# Patient Record
Sex: MALE | Race: WHITE | NOT HISPANIC OR LATINO | Employment: FULL TIME | URBAN - METROPOLITAN AREA
[De-identification: names, ages, dates, MRNs, and addresses within clinical notes are randomized per-mention and may not be internally consistent; named-entity substitution may affect disease eponyms.]

---

## 2019-11-01 ENCOUNTER — HOSPITAL ENCOUNTER (EMERGENCY)
Facility: MEDICAL CENTER | Age: 34
End: 2019-11-02
Attending: EMERGENCY MEDICINE
Payer: COMMERCIAL

## 2019-11-01 VITALS
OXYGEN SATURATION: 95 % | WEIGHT: 156.53 LBS | HEIGHT: 72 IN | TEMPERATURE: 98.2 F | DIASTOLIC BLOOD PRESSURE: 82 MMHG | BODY MASS INDEX: 21.2 KG/M2 | SYSTOLIC BLOOD PRESSURE: 117 MMHG | HEART RATE: 60 BPM | RESPIRATION RATE: 17 BRPM

## 2019-11-01 DIAGNOSIS — S05.01XA ABRASION OF RIGHT CORNEA, INITIAL ENCOUNTER: ICD-10-CM

## 2019-11-01 PROCEDURE — 99283 EMERGENCY DEPT VISIT LOW MDM: CPT

## 2019-11-01 RX ORDER — PROPARACAINE HYDROCHLORIDE 5 MG/ML
1 SOLUTION/ DROPS OPHTHALMIC ONCE
Status: COMPLETED | OUTPATIENT
Start: 2019-11-02 | End: 2019-11-02

## 2019-11-01 SDOH — HEALTH STABILITY: MENTAL HEALTH: HOW OFTEN DO YOU HAVE A DRINK CONTAINING ALCOHOL?: NEVER

## 2019-11-01 ASSESSMENT — LIFESTYLE VARIABLES: DO YOU DRINK ALCOHOL: NO

## 2019-11-01 NOTE — LETTER
FORM C-4:  EMPLOYEE’S CLAIM FOR COMPENSATION/ REPORT OF INITIAL TREATMENT  EMPLOYEE’S CLAIM - PROVIDE ALL INFORMATION REQUESTED   First Name  Dean Last Name  Kayla Birthdate  1985 Age  34 y.o. Sex  male Claim Number   Home Employee Address  [address] [place]  [address] Height  1.829 m (6') Weight  71 kg (156 lb 8.4 oz) N  [id number]   Mailing Employee Address                           [address]  [address] Telephone  [phone] (home)  Primary Language Spoken  ENGLISH   Insurer  *** Third Party   N/A Employee's Occupation (Job Title) When Injury or Occupational Disease Occurred     Employer's Name   Telephone      Employer Address   City   State   Zip     Date of Injury  11/1/2019       Hour of Injury  5:00 PM Date Employer Notified  11/1/2019 Last Day of Work after Injury or Occupational Disease  11/1/2019 Supervisor to Whom Injury Reported  Tiffany Weiss   Address or Location of Accident (if applicable)  [[address] ]   What were you doing at the time of accident? (if applicable)  Cutting ceiling openings   How did this injury or occupational disease occur? Be specific and answer in detail. 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   If you believe that you have an occupational disease, when did you first have knowledge of the disability and it relationship to your employment?  N/A Witnesses to the Accident  Tiffany Weiss     Nature of Injury or Occupational Disease  Workers' Compensation  Part(s) of Body Injured or Affected  Eye (R), N/A, N/A   I certify that the above is true and correct to the best of my knowledge and that I have provided this information in order to obtain the benefits of [place] or Chapter 617 of [place]  I hereby authorize any physician, chiropractor, surgeon, practitioner, or other  person, any hospital, including [facility] or [facility] hospital, any medical service organization, any insurance company, or other institution or organization to release to each other, any medical or other information, including benefits paid or payable, pertinent to this injury or disease, except information relative to diagnosis, treatment and/or counseling for AIDS, psychological conditions, alcohol or controlled substances, for which I must give specific authorization.  A Photostat of this authorization shall be as valid as the original.   Date   Place Employee’s Signature         THIS REPORT MUST BE COMPLETED AND MAILED WITHIN 3 WORKING DAYS OF TREATMENT   Place  [facility], EMERGENCY DEPT Name of Facility   [facility]   Date  11/1/2019 Diagnosis  (S05.01XA) Abrasion of right cornea, initial encounter Is there evidence the injured employee was under the influence of alcohol and/or another controlled substance at the time of accident?      Hour  12:21 AM Description of Injury or Disease  Abrasion of right cornea, initial encounter    Treatment    Have you advised the patient to remain off work five days or more?      X-Ray Findings    If Yes From Date    To Date      From information given by the employee, together with medical evidence, can you directly connect this injury or occupational disease as job incurred?    If No, is the employee capable of: Full Duty    Modified Duty      Is additional medical care by a physician indicated?    If Modified Duty, Specify any Limitations / Restrictions      Do you know of any previous injury or disease contributing to this condition or occupational disease?      Date  11/2/2019 Print Doctor’s Name  Shahzad Leal I certify the employer’s copy of this form was mailed on:   Address  [address] [place] [address] [id number] [phone] Insurer’s Use Only   Provider’s Tax ID Number  [id number] Telephone  Dept:  "[phone]    Doctor’s Signature    Degree             BRIEF DESCRIPTION OF RIGHTS AND BENEFITS  ([place]ursuant to [place]RS 616C.050)    Notice of Injury or Occupational Disease (Incident Report Form C-1): If an injury or occupational disease (OD) arises out of and in the course of employment, you must provide written notice to your employer as soon as practicable, but no later than 7 days after the accident or OD. Your employer shall maintain a sufficient supply of the required forms.    Claim for Compensation (Form C-4): If medical treatment is sought, the form C-4 is available at the place of initial treatment. A completed \"Claim for Compensation\" (Form C-4) must be filed within 90 days after an accident or OD. The treating physician or chiropractor must, within 3 working days after treatment, complete and mail to the employer, the employer's insurer and third-party , the Claim for Compensation.    Medical Treatment: If you require medical treatment for your on-the-job injury or OD, you may be required to select a physician or chiropractor from a list provided by your workers’ compensation insurer, if it has contracted with an Organization for Managed Care (MCO) or Preferred Provider Organization (PPO) or providers of health care. If your employer has not entered into a contract with an MCO or PPO, you may select a physician or chiropractor from the Panel of Physicians and Chiropractors. Any medical costs related to your industrial injury or OD will be paid by your insurer.    Temporary Total Disability (TTD): If your doctor has certified that you are unable to work for a period of at least 5 consecutive days, or 5 cumulative days in a 20-day period, or places restrictions on you that your employer does not accommodate, you may be entitled to TTD compensation.    Temporary Partial Disability (TPD): If the wage you receive upon reemployment is less than the compensation for TTD to which you are entitled, the " insurer may be required to pay you TPD compensation to make up the difference. TPD can only be paid for a maximum of 24 months.    Permanent Partial Disability (PPD): When your medical condition is stable and there is an indication of a PPD as a result of your injury or OD, within 30 days, your insurer must arrange for an evaluation by a rating physician or chiropractor to determine the degree of your PPD. The amount of your PPD award depends on the date of injury, the results of the PPD evaluation and your age and wage.    Permanent Total Disability (PTD): If you are medically certified by a treating physician or chiropractor as permanently and totally disabled and have been granted a PTD status by your insurer, you are entitled to receive monthly benefits not to exceed 66 2/3% of your average monthly wage. The amount of your PTD payments is subject to reduction if you previously received a PPD award.    Vocational Rehabilitation Services: You may be eligible for vocational rehabilitation services if you are unable to return to the job due to a permanent physical impairment or permanent restrictions as a result of your injury or occupational disease.    Transportation and Per Tc Reimbursement: You may be eligible for travel expenses and per tc associated with medical treatment.    Reopening: You may be able to reopen your claim if your condition worsens after claim closure.     Appeal Process: If you disagree with a written determination issued by the insurer or the insurer does not respond to your request, you may appeal to the Department of Administration, , by following the instructions contained in your determination letter. You must appeal the determination within 70 days from the date of the determination letter at [address], [address], [place], [place] [address], or [address], [address], [place] [address]. If you disagree with the  decision, you  may appeal to the Department of Administration, . You must file your appeal within 30 days from the date of the  decision letter at [address], [address], [place], [place] [address], or [address], [place], [place] [address]. If you disagree with a decision of an , you may file a petition for judicial review with the District Court. You must do so within 30 days of the Appeal Officer’s decision. You may be represented by an  at your own expense or you may contact the [facility] for possible representation.    [place]  for Injured Workers (NAIW): If you disagree with a  decision, you may request that NAIW represent you without charge at an  Hearing. For information regarding denial of benefits, you may contact the [facility] at: [address], [address], [place] [address], [phone], or [address], [address], [place] [address], [phone]    To File a Complaint with the Division: If you wish to file a complaint with the  of the Division of Industrial Relations (DIR),  please contact the Workers’ Compensation Section, [address], [address], [place], [place] [address], telephone [phone], or [address], [address], [place], [place] [address], telephone [phone].    For assistance with Workers’ Compensation Issues: You may contact the Office of the Governor Consumer Health Assistance, [address], [address], [place], [place] [address], Toll Free 1-265.707.8555, Web site: http://govcha.[facility].nv., E-mail lucy@govcha.[facility].nv.  D-2 (rev. 06/18)    __________________________________________________________________                                    _________________  Employee Name / Signature                                                                                                                           Date

## 2019-11-01 NOTE — LETTER
FORM C-4:  EMPLOYEE’S CLAIM FOR COMPENSATION/ REPORT OF INITIAL TREATMENT  EMPLOYEE’S CLAIM - PROVIDE ALL INFORMATION REQUESTED   First Name  Dean Last Name  Kayla Birthdate  1985 Age  34 y.o. Sex  male Claim Number   Home Employee Address  [address] [place]  [address] Height  1.829 m (6') Weight  71 kg (156 lb 8.4 oz) N  [id number]   Mailing Employee Address                           [address]  [address] Telephone  [phone] (home)  Primary Language Spoken  ENGLISH   Insurer  *** Third Party   N/A Employee's Occupation (Job Title) When Injury or Occupational Disease Occurred     Employer's Name   Telephone      Employer Address   City   State   Zip     Date of Injury  11/1/2019       Hour of Injury  5:00 PM Date Employer Notified  11/1/2019 Last Day of Work after Injury or Occupational Disease  11/1/2019 Supervisor to Whom Injury Reported  Tiffany Weiss   Address or Location of Accident (if applicable)  [[address] ]   What were you doing at the time of accident? (if applicable)  Cutting ceiling openings   How did this injury or occupational disease occur? Be specific and answer in detail. 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   If you believe that you have an occupational disease, when did you first have knowledge of the disability and it relationship to your employment?  N/A Witnesses to the Accident  Tiffany Weiss     Nature of Injury or Occupational Disease  Workers' Compensation  Part(s) of Body Injured or Affected  Eye (R), N/A, N/A   I certify that the above is true and correct to the best of my knowledge and that I have provided this information in order to obtain the benefits of [place]’s [place] ([place] 616A [place], inclusive or Chapter 617 of [place]RS).  I hereby authorize any physician, chiropractor, surgeon, practitioner, or other  person, any hospital, including [facility] or [facility] hospital, any medical service organization, any insurance company, or other institution or organization to release to each other, any medical or other information, including benefits paid or payable, pertinent to this injury or disease, except information relative to diagnosis, treatment and/or counseling for AIDS, psychological conditions, alcohol or controlled substances, for which I must give specific authorization.  A Photostat of this authorization shall be as valid as the original.   Date   Place Employee’s Signature         THIS REPORT MUST BE COMPLETED AND MAILED WITHIN 3 WORKING DAYS OF TREATMENT   Place  [facility], EMERGENCY DEPT Name of Facility   [facility]   Date  11/1/2019 Diagnosis  (S05.01XA) Abrasion of right cornea, initial encounter Is there evidence the injured employee was under the influence of alcohol and/or another controlled substance at the time of accident?      Hour  12:22 AM Description of Injury or Disease  Abrasion of right cornea, initial encounter    Treatment    Have you advised the patient to remain off work five days or more?      X-Ray Findings    If Yes From Date    To Date      From information given by the employee, together with medical evidence, can you directly connect this injury or occupational disease as job incurred?    If No, is the employee capable of: Full Duty    Modified Duty      Is additional medical care by a physician indicated?    If Modified Duty, Specify any Limitations / Restrictions      Do you know of any previous injury or disease contributing to this condition or occupational disease?      Date  11/2/2019 Print Doctor’s Name  Shahzad Leal I certify the employer’s copy of this form was mailed on:   Address  [address] [place] [address] [id number] [phone] Insurer’s Use Only   Provider’s Tax ID Number  [id number] Telephone  Dept:  "[phone]    Doctor’s Signature    Degree             BRIEF DESCRIPTION OF RIGHTS AND BENEFITS  ([place]ursuant to [place]RS 616C.050)    Notice of Injury or Occupational Disease (Incident Report Form C-1): If an injury or occupational disease (OD) arises out of and in the course of employment, you must provide written notice to your employer as soon as practicable, but no later than 7 days after the accident or OD. Your employer shall maintain a sufficient supply of the required forms.    Claim for Compensation (Form C-4): If medical treatment is sought, the form C-4 is available at the place of initial treatment. A completed \"Claim for Compensation\" (Form C-4) must be filed within 90 days after an accident or OD. The treating physician or chiropractor must, within 3 working days after treatment, complete and mail to the employer, the employer's insurer and third-party , the Claim for Compensation.    Medical Treatment: If you require medical treatment for your on-the-job injury or OD, you may be required to select a physician or chiropractor from a list provided by your workers’ compensation insurer, if it has contracted with an Organization for Managed Care (MCO) or Preferred Provider Organization (PPO) or providers of health care. If your employer has not entered into a contract with an MCO or PPO, you may select a physician or chiropractor from the Panel of Physicians and Chiropractors. Any medical costs related to your industrial injury or OD will be paid by your insurer.    Temporary Total Disability (TTD): If your doctor has certified that you are unable to work for a period of at least 5 consecutive days, or 5 cumulative days in a 20-day period, or places restrictions on you that your employer does not accommodate, you may be entitled to TTD compensation.    Temporary Partial Disability (TPD): If the wage you receive upon reemployment is less than the compensation for TTD to which you are entitled, the " insurer may be required to pay you TPD compensation to make up the difference. TPD can only be paid for a maximum of 24 months.    Permanent Partial Disability (PPD): When your medical condition is stable and there is an indication of a PPD as a result of your injury or OD, within 30 days, your insurer must arrange for an evaluation by a rating physician or chiropractor to determine the degree of your PPD. The amount of your PPD award depends on the date of injury, the results of the PPD evaluation and your age and wage.    Permanent Total Disability (PTD): If you are medically certified by a treating physician or chiropractor as permanently and totally disabled and have been granted a PTD status by your insurer, you are entitled to receive monthly benefits not to exceed 66 2/3% of your average monthly wage. The amount of your PTD payments is subject to reduction if you previously received a PPD award.    Vocational Rehabilitation Services: You may be eligible for vocational rehabilitation services if you are unable to return to the job due to a permanent physical impairment or permanent restrictions as a result of your injury or occupational disease.    Transportation and Per Tc Reimbursement: You may be eligible for travel expenses and per tc associated with medical treatment.    Reopening: You may be able to reopen your claim if your condition worsens after claim closure.     Appeal Process: If you disagree with a written determination issued by the insurer or the insurer does not respond to your request, you may appeal to the Department of Administration, , by following the instructions contained in your determination letter. You must appeal the determination within 70 days from the date of the determination letter at [address], [address], [place], [place] [address], or [address], [address], [place] [address]. If you disagree with the  decision, you  may appeal to the Department of Administration, . You must file your appeal within 30 days from the date of the  decision letter at [address], [address], [place], [place] [address], or [address], [place], [place] [address]. If you disagree with a decision of an , you may file a petition for judicial review with the District Court. You must do so within 30 days of the Appeal Officer’s decision. You may be represented by an  at your own expense or you may contact the [facility] for possible representation.    [place]  for In[place] ([place]IW): If you disagree with a  decision, you may request that NAIW represent you without charge at an  Hearing. For information regarding denial of benefits, you may contact the [facility] at: [address], [address], [place] [address], [phone], or [address], [place] [address], [phone]    To File a Complaint with the Division: If you wish to file a complaint with the  of the Division of Industrial Relations (DIR),  please contact the Workers’ Compensation Section, [address], [address], [place], [place] [address], telephone [phone]) [phone], or [address], [address], [place], [place] [address], telephone [phone].    For assistance with Workers’ Compensation Issues: You may contact the Office of the Governor Consumer Health Assistance, [address], [address], [place], [place] [address], Toll Free 1-275.129.6188, Web site: http://govcha.[facility].nv., E-mail lucy@[email or web address].[email or web address].  D-2 (rev. 06/18)    __________________________________________________________________                                    _________________  Employee Name / Signature                                                                                                                           Date

## 2019-11-01 NOTE — LETTER
FORM C-4:  EMPLOYEE’S CLAIM FOR COMPENSATION/ REPORT OF INITIAL TREATMENT  EMPLOYEE’S CLAIM - PROVIDE ALL INFORMATION REQUESTED   First Name  Dean Last Name  Kayla Birthdate  1985 Age  34 y.o. Sex  male Claim Number   Home Employee Address  [address] [place]  [address] Height  1.829 m (6') Weight  71 kg (156 lb 8.4 oz) N  [id number]   Mailing Employee Address                           [address]  [address] Telephone  [phone] (home)  Primary Language Spoken  ENGLISH   Insurer  *** Third Party   N/A Employee's Occupation (Job Title) When Injury or Occupational Disease Occurred     Employer's Name   Telephone      Employer Address   City   State   Zip     Date of Injury  11/1/2019       Hour of Injury  5:00 PM Date Employer Notified  11/1/2019 Last Day of Work after Injury or Occupational Disease  11/1/2019 Supervisor to Whom Injury Reported  Tiffany Weiss   Address or Location of Accident (if applicable)  [[address] ]   What were you doing at the time of accident? (if applicable)  Cutting ceiling openings   How did this injury or occupational disease occur? Be specific and answer in detail. 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   If you believe that you have an occupational disease, when did you first have knowledge of the disability and it relationship to your employment?  N/A Witnesses to the Accident  Tiffany Weiss     Nature of Injury or Occupational Disease  Workers' Compensation  Part(s) of Body Injured or Affected  Eye (R), N/A, N/A   I certify that the above is true and correct to the best of my knowledge and that I have provided this information in order to obtain the benefits of [place] inclusive or Chapter 617 of [place]RS).  I hereby authorize any physician, chiropractor, surgeon, practitioner, or other  person, any hospital, including [facility] hospital, any medical service organization, any insurance company, or other institution or organization to release to each other, any medical or other information, including benefits paid or payable, pertinent to this injury or disease, except information relative to diagnosis, treatment and/or counseling for AIDS, psychological conditions, alcohol or controlled substances, for which I must give specific authorization.  A Photostat of this authorization shall be as valid as the original.   Date   Place Employee’s Signature         THIS REPORT MUST BE COMPLETED AND MAILED WITHIN 3 WORKING DAYS OF TREATMENT   Place  [facility], EMERGENCY DEPT Name of Facility   [facility]   Date  11/1/2019 Diagnosis  (S05.01XA) Abrasion of right cornea, initial encounter Is there evidence the injured employee was under the influence of alcohol and/or another controlled substance at the time of accident?      Hour  11:53 PM Description of Injury or Disease  Abrasion of right cornea, initial encounter    Treatment    Have you advised the patient to remain off work five days or more?      X-Ray Findings    If Yes From Date    To Date      From information given by the employee, together with medical evidence, can you directly connect this injury or occupational disease as job incurred?    If No, is the employee capable of: Full Duty    Modified Duty      Is additional medical care by a physician indicated?    If Modified Duty, Specify any Limitations / Restrictions      Do you know of any previous injury or disease contributing to this condition or occupational disease?      Date  11/1/2019 Print Doctor’s Name  Shahzad Leal I certify the employer’s copy of this form was mailed on:   Address  [address] [place] [address] [phone] Insurer’s Use Only   Provider’s Tax ID Number  [id number] Telephone  Dept:  "[phone]    Doctor’s Signature    Degree             BRIEF DESCRIPTION OF RIGHTS AND BENEFITS  (Pursuant to [place]RS 616C.050)    Notice of Injury or Occupational Disease (Incident Report Form C-1): If an injury or occupational disease (OD) arises out of and in the course of employment, you must provide written notice to your employer as soon as practicable, but no later than 7 days after the accident or OD. Your employer shall maintain a sufficient supply of the required forms.    Claim for Compensation (Form C-4): If medical treatment is sought, the form C-4 is available at the place of initial treatment. A completed \"Claim for Compensation\" (Form C-4) must be filed within 90 days after an accident or OD. The treating physician or chiropractor must, within 3 working days after treatment, complete and mail to the employer, the employer's insurer and third-party , the Claim for Compensation.    Medical Treatment: If you require medical treatment for your on-the-job injury or OD, you may be required to select a physician or chiropractor from a list provided by your workers’ compensation insurer, if it has contracted with an Organization for Managed Care (MCO) or Preferred Provider Organization (PPO) or providers of health care. If your employer has not entered into a contract with an MCO or PPO, you may select a physician or chiropractor from the Panel of Physicians and Chiropractors. Any medical costs related to your industrial injury or OD will be paid by your insurer.    Temporary Total Disability (TTD): If your doctor has certified that you are unable to work for a period of at least 5 consecutive days, or 5 cumulative days in a 20-day period, or places restrictions on you that your employer does not accommodate, you may be entitled to TTD compensation.    Temporary Partial Disability (TPD): If the wage you receive upon reemployment is less than the compensation for TTD to which you are entitled, the " insurer may be required to pay you TPD compensation to make up the difference. TPD can only be paid for a maximum of 24 months.    Permanent Partial Disability (PPD): When your medical condition is stable and there is an indication of a PPD as a result of your injury or OD, within 30 days, your insurer must arrange for an evaluation by a rating physician or chiropractor to determine the degree of your PPD. The amount of your PPD award depends on the date of injury, the results of the PPD evaluation and your age and wage.    Permanent Total Disability (PTD): If you are medically certified by a treating physician or chiropractor as permanently and totally disabled and have been granted a PTD status by your insurer, you are entitled to receive monthly benefits not to exceed 66 2/3% of your average monthly wage. The amount of your PTD payments is subject to reduction if you previously received a PPD award.    Vocational Rehabilitation Services: You may be eligible for vocational rehabilitation services if you are unable to return to the job due to a permanent physical impairment or permanent restrictions as a result of your injury or occupational disease.    Transportation and Per Tc Reimbursement: You may be eligible for travel expenses and per tc associated with medical treatment.    Reopening: You may be able to reopen your claim if your condition worsens after claim closure.     Appeal Process: If you disagree with a written determination issued by the insurer or the insurer does not respond to your request, you may appeal to the Department of Administration, , by following the instructions contained in your determination letter. You must appeal the determination within 70 days from the date of the determination letter at [address], [address], [place], [place] [address], or [address], [address], [place] [address]. If you disagree with the  decision, you  may appeal to the Department of Administration, . You must file your appeal within 30 days from the date of the  decision letter at [address], [address], [place], [place] [address], or [address], [place], [place] [address]. If you disagree with a decision of an , you may file a petition for judicial review with the District Court. You must do so within 30 days of the Appeal Officer’s decision. You may be represented by an  at your own expense or you may contact the [facility] for possible representation.    [place]  for Injured Workers (NAIW): If you disagree with a  decision, you may request that NAIW represent you without charge at an  Hearing. For information regarding denial of benefits, you may contact the [facility] at: [address], [address], [place] [address], [phone], or [address], [place] [address], [phone]    To File a Complaint with the Division: If you wish to file a complaint with the  of the Division of Industrial Relations (DIR),  please contact the Workers’ Compensation Section, [address], [address], [place], [place] [address], telephone (102) [phone], or [address], [address], [place], [place] [address], telephone [phone].    For assistance with Workers’ Compensation Issues: You may contact the Office of the Governor Consumer Health Assistance, [address], [address], [place], [place] [address], Toll Free 1-385.718.1947, Web site: http://govcha.[facility].nv., E-mail lucy@govcha.[facility].nv.  D-2 (rev. 06/18)    __________________________________________________________________                                    _________________  Employee Name / Signature                                                                                                                           Date

## 2019-11-01 NOTE — LETTER
FORM C-4:  EMPLOYEE’S CLAIM FOR COMPENSATION/ REPORT OF INITIAL TREATMENT  EMPLOYEE’S CLAIM - PROVIDE ALL INFORMATION REQUESTED   First Name  Dean Last Name  Kayla Birthdate  1985 Age  34 y.o. Sex  male Claim Number   Home Employee Address  [address] [place]  [address] Height  1.829 m (6') Weight  71 kg (156 lb 8.4 oz) [facility]     Mailing Employee Address                           [address]               Zip  [address] Telephone  [phone] (home)  Primary Language Spoken  ENGLISH   Insurer   Third Party    Employee's Occupation (Job Title) When Injury or Occupational Disease Occurred  [employer]   Employer's Name  [employer] Telephone   [phone]   Employer Address  [address] [place]  [address]   Date of Injury  11/1/2019       Hour of Injury  5:00 PM Date Employer Notified  11/1/2019 Last Day of Work after Injury or Occupational Disease  11/1/2019 Supervisor to Whom Injury Reported  Tiffany Weiss   Address or Location of Accident (if applicable)  [[address] ]   What were you doing at the time of accident? (if applicable)  Cutting ceiling openings   How did this injury or occupational disease occur? Be specific and answer in detail. 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   If you believe that you have an occupational disease, when did you first have knowledge of the disability and it relationship to your employment?  N/A Witnesses to the Accident  Tiffany Weiss     Nature of Injury or Occupational Disease  Workers' Compensation  Part(s) of Body Injured or Affected  Eye (R), N/A, N/A   I certify that the above is true and correct to the best of my knowledge and that I have provided this information in order to obtain the benefits of [place]’s [place] ([place] 616A to [place]D, inclusive or Chapter 617 of [place]RS).   I hereby authorize any physician, chiropractor, surgeon, practitioner, or other person, any hospital, including [facility], any medical service organization, any insurance company, or other institution or organization to release to each other, any medical or other information, including benefits paid or payable, pertinent to this injury or disease, except information relative to diagnosis, treatment and/or counseling for AIDS, psychological conditions, alcohol or controlled substances, for which I must give specific authorization.  A Photostat of this authorization shall be as valid as the original.   Date  11/02/2019 [facility] Employee’s Signature         THIS REPORT MUST BE COMPLETED AND MAILED WITHIN 3 WORKING DAYS OF TREATMENT   Place  [facility], EMERGENCY DEPT Name of Facility   [facility]   Date  11/1/2019 Diagnosis  (S05.01XA) Abrasion of right cornea, initial encounter Is there evidence the injured employee was under the influence of alcohol and/or another controlled substance at the time of accident?  No   Hour  12:27 AM Description of Injury or Disease  Abrasion of right cornea, initial encounter    Treatment  eval   Have you advised the patient to remain off work five days or more?  No   X-Ray Findings    If Yes From Date    To Date      From information given by the employee, together with medical evidence, can you directly connect this injury or occupational disease as job incurred?  Yes If No, is the employee capable of: Full Duty  Yes Modified Duty  No   Is additional medical care by a physician indicated?  No If Modified Duty, Specify any Limitations / Restrictions      Do you know of any previous injury or disease contributing to this condition or occupational disease?  No   Date  11/2/2019 Print Doctor’s Name  Shahzad Leal I certify the employer’s copy of this form was mailed on:  "  Address  [address] [place] [id number] [phone] Insurer’s Use Only   Provider’s Tax ID Number  [id number] Telephone  Dept: [phone]    Doctor’s Signature  kamila-PEARL Seth M.D., MD          BRIEF DESCRIPTION OF RIGHTS AND BENEFITS  ([place]ursuant to [place]RS 616C.050)    Notice of Injury or Occupational Disease (Incident Report Form C-1): If an injury or occupational disease (OD) arises out of and in the course of employment, you must provide written notice to your employer as soon as practicable, but no later than 7 days after the accident or OD. Your employer shall maintain a sufficient supply of the required forms.    Claim for Compensation (Form C-4): If medical treatment is sought, the form C-4 is available at the place of initial treatment. A completed \"Claim for Compensation\" (Form C-4) must be filed within 90 days after an accident or OD. The treating physician or chiropractor must, within 3 working days after treatment, complete and mail to the employer, the employer's insurer and third-party , the Claim for Compensation.    Medical Treatment: If you require medical treatment for your on-the-job injury or OD, you may be required to select a physician or chiropractor from a list provided by your workers’ compensation insurer, if it has contracted with an Organization for Managed Care (MCO) or Preferred Provider Organization (PPO) or providers of health care. If your employer has not entered into a contract with an MCO or PPO, you may select a physician or chiropractor from the Panel of Physicians and Chiropractors. Any medical costs related to your industrial injury or OD will be paid by your insurer.    Temporary Total Disability (TTD): If your doctor has certified that you are unable to work for a period of at least 5 consecutive days, or 5 cumulative days in a 20-day period, or places restrictions on you that your employer does not accommodate, you may be entitled to TTD " compensation.    Temporary Partial Disability (TPD): If the wage you receive upon reemployment is less than the compensation for TTD to which you are entitled, the insurer may be required to pay you TPD compensation to make up the difference. TPD can only be paid for a maximum of 24 months.    Permanent Partial Disability (PPD): When your medical condition is stable and there is an indication of a PPD as a result of your injury or OD, within 30 days, your insurer must arrange for an evaluation by a rating physician or chiropractor to determine the degree of your PPD. The amount of your PPD award depends on the date of injury, the results of the PPD evaluation and your age and wage.    Permanent Total Disability (PTD): If you are medically certified by a treating physician or chiropractor as permanently and totally disabled and have been granted a PTD status by your insurer, you are entitled to receive monthly benefits not to exceed 66 2/3% of your average monthly wage. The amount of your PTD payments is subject to reduction if you previously received a PPD award.    Vocational Rehabilitation Services: You may be eligible for vocational rehabilitation services if you are unable to return to the job due to a permanent physical impairment or permanent restrictions as a result of your injury or occupational disease.    Transportation and Per Tc Reimbursement: You may be eligible for travel expenses and per tc associated with medical treatment.    Reopening: You may be able to reopen your claim if your condition worsens after claim closure.     Appeal Process: If you disagree with a written determination issued by the insurer or the insurer does not respond to your request, you may appeal to the Department of Administration, , by following the instructions contained in your determination letter. You must appeal the determination within 70 days from the date of the determination letter at [address], [address], [place], [place] [address], or [address], [address], [place], [place] [address]. If you disagree with the  decision, you may appeal to the Department of Administration, . You must file your appeal within 30 days from the date of the  decision letter at [address], [address], [place], [place] [address], or [address], [address], [place], [place] [address]. If you disagree with a decision of an , you may file a petition for judicial review with the District Court. You must do so within 30 days of the Appeal Officer’s decision. You may be represented by an  at your own expense or you may contact the [facility] for possible representation.    [place]  for In[place] Workers ([place]IW): If you disagree with a  decision, you may request that NAIW represent you without charge at an  Hearing. For information regarding denial of benefits, you may contact the [facility] at: [address], [address], [place] [address], [phone], or [address], [place] [address], [phone]    To File a Complaint with the Division: If you wish to file a complaint with the  of the Division of Industrial Relations (DIR),  please contact the Workers’ Compensation Section, [address], [address], [place], [place] [address], telephone [phone]) [phone], or [address], [address], [place], [place] [address], telephone [phone].    For assistance with Workers’ Compensation Issues: You may contact the Office of the Governor Consumer Health Assistance, [address], [place], [place] [address], Toll Free 1-655.693.8754, Web site: http://govVardhman Textiles.[facility].nv., E-mail lucy@govcha.[facility].nv.  D-2 (rev. 06/18)    __________________________________________________________________                                     _________________  Employee Name / Signature                                                                                                                           Date

## 2019-11-01 NOTE — LETTER
FORM C-4:  EMPLOYEE’S CLAIM FOR COMPENSATION/ REPORT OF INITIAL TREATMENT  EMPLOYEE’S CLAIM - PROVIDE ALL INFORMATION REQUESTED   First Name  Dean Last Name  Kayla Birthdate  1985 Age  34 y.o. Sex  male Claim Number   Home Employee Address  [address]  [address] Height  1.829 m (6') Weight  71 kg (156 lb 8.4 oz) N  [id number]   Mailing Employee Address                           [address]  [address] Telephone  [phone] (home)  Primary Language Spoken  ENGLISH   Insurer  *** Third Party   N/A Employee's Occupation (Job Title) When Injury or Occupational Disease Occurred     Employer's Name   Telephone      Employer Address   City   State   Zip     Date of Injury  11/1/2019       Hour of Injury  5:00 PM Date Employer Notified  11/1/2019 Last Day of Work after Injury or Occupational Disease  11/1/2019 Supervisor to Whom Injury Reported  Tiffany Weiss   Address or Location of Accident (if applicable)  [[address] ]   What were you doing at the time of accident? (if applicable)  Cutting ceiling openings   How did this injury or occupational disease occur? Be specific and answer in detail. 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   If you believe that you have an occupational disease, when did you first have knowledge of the disability and it relationship to your employment?  N/A Witnesses to the Accident  Tiffany Weiss     Nature of Injury or Occupational Disease  Workers' Compensation  Part(s) of Body Injured or Affected  Eye (R), N/A, N/A   I certify that the above is true and correct to the best of my knowledge and that I have provided this information in order to obtain the benefits of [place] or Chapter 617 of [place]  I hereby authorize any physician, chiropractor, surgeon, practitioner, or other  person, any hospital, including [facility] hospital, any medical service organization, any insurance company, or other institution or organization to release to each other, any medical or other information, including benefits paid or payable, pertinent to this injury or disease, except information relative to diagnosis, treatment and/or counseling for AIDS, psychological conditions, alcohol or controlled substances, for which I must give specific authorization.  A Photostat of this authorization shall be as valid as the original.   Date   Place Employee’s Signature         THIS REPORT MUST BE COMPLETED AND MAILED WITHIN 3 WORKING DAYS OF TREATMENT   Place  [facility], EMERGENCY DEPT Name of Facility   [facility]   Date  11/1/2019 Diagnosis  (S05.01XA) Abrasion of right cornea, initial encounter Is there evidence the injured employee was under the influence of alcohol and/or another controlled substance at the time of accident?      Hour  12:24 AM Description of Injury or Disease  Abrasion of right cornea, initial encounter    Treatment    Have you advised the patient to remain off work five days or more?      X-Ray Findings    If Yes From Date    To Date      From information given by the employee, together with medical evidence, can you directly connect this injury or occupational disease as job incurred?    If No, is the employee capable of: Full Duty    Modified Duty      Is additional medical care by a physician indicated?    If Modified Duty, Specify any Limitations / Restrictions      Do you know of any previous injury or disease contributing to this condition or occupational disease?      Date  11/2/2019 Print Doctor’s Name  Shahzad Leal I certify the employer’s copy of this form was mailed on:   Address  [address] [place] [address] [id number] [phone] Insurer’s Use Only   Provider’s Tax ID Number  [id number] Telephone  Dept:  "[phone]    Doctor’s Signature    Degree             BRIEF DESCRIPTION OF RIGHTS AND BENEFITS  (Pursuant to [place]RS 616C.050)    Notice of Injury or Occupational Disease (Incident Report Form C-1): If an injury or occupational disease (OD) arises out of and in the course of employment, you must provide written notice to your employer as soon as practicable, but no later than 7 days after the accident or OD. Your employer shall maintain a sufficient supply of the required forms.    Claim for Compensation (Form C-4): If medical treatment is sought, the form C-4 is available at the place of initial treatment. A completed \"Claim for Compensation\" (Form C-4) must be filed within 90 days after an accident or OD. The treating physician or chiropractor must, within 3 working days after treatment, complete and mail to the employer, the employer's insurer and third-party , the Claim for Compensation.    Medical Treatment: If you require medical treatment for your on-the-job injury or OD, you may be required to select a physician or chiropractor from a list provided by your workers’ compensation insurer, if it has contracted with an Organization for Managed Care (MCO) or Preferred Provider Organization (PPO) or providers of health care. If your employer has not entered into a contract with an MCO or PPO, you may select a physician or chiropractor from the Panel of Physicians and Chiropractors. Any medical costs related to your industrial injury or OD will be paid by your insurer.    Temporary Total Disability (TTD): If your doctor has certified that you are unable to work for a period of at least 5 consecutive days, or 5 cumulative days in a 20-day period, or places restrictions on you that your employer does not accommodate, you may be entitled to TTD compensation.    Temporary Partial Disability (TPD): If the wage you receive upon reemployment is less than the compensation for TTD to which you are entitled, the " insurer may be required to pay you TPD compensation to make up the difference. TPD can only be paid for a maximum of 24 months.    Permanent Partial Disability (PPD): When your medical condition is stable and there is an indication of a PPD as a result of your injury or OD, within 30 days, your insurer must arrange for an evaluation by a rating physician or chiropractor to determine the degree of your PPD. The amount of your PPD award depends on the date of injury, the results of the PPD evaluation and your age and wage.    Permanent Total Disability (PTD): If you are medically certified by a treating physician or chiropractor as permanently and totally disabled and have been granted a PTD status by your insurer, you are entitled to receive monthly benefits not to exceed 66 2/3% of your average monthly wage. The amount of your PTD payments is subject to reduction if you previously received a PPD award.    Vocational Rehabilitation Services: You may be eligible for vocational rehabilitation services if you are unable to return to the job due to a permanent physical impairment or permanent restrictions as a result of your injury or occupational disease.    Transportation and Per Tc Reimbursement: You may be eligible for travel expenses and per tc associated with medical treatment.    Reopening: You may be able to reopen your claim if your condition worsens after claim closure.     Appeal Process: If you disagree with a written determination issued by the insurer or the insurer does not respond to your request, you may appeal to the Department of Administration, , by following the instructions contained in your determination letter. You must appeal the determination within 70 days from the date of the determination letter at [address], [address], [place], [place] [address], or [address], [address], [place] [address]. If you disagree with the  decision, you  may appeal to the Department of Administration, . You must file your appeal within 30 days from the date of the  decision letter at [address], [address], [place], [place] [address], or [address], [place], [place] [address]. If you disagree with a decision of an , you may file a petition for judicial review with the District Court. You must do so within 30 days of the Appeal Officer’s decision. You may be represented by an  at your own expense or you may contact the [facility] for possible representation.    [place]  for Injured Workers (NAIW): If you disagree with a  decision, you may request that NAIW represent you without charge at an  Hearing. For information regarding denial of benefits, you may contact the [facility] at: [address], [address], [place] [address], [phone], or [address], [address], [place] [address], [phone]    To File a Complaint with the Division: If you wish to file a complaint with the  of the Division of Industrial Relations (DIR),  please contact the Workers’ Compensation Section, [address], [address], [place], [place] [address], telephone [phone], or [address], [address], [place], [place] [address], telephone [phone].    For assistance with Workers’ Compensation Issues: You may contact the Office of the Governor Consumer Health Assistance, [address], [address], [place], [place] [address], Toll Free 1-838.464.5235, Web site: http://govcha.[facility].nv., E-mail lucy@govcha.[facility].nv.  D-2 (rev. 06/18)    __________________________________________________________________                                    _________________  Employee Name / Signature                                                                                                                           Date

## 2019-11-01 NOTE — LETTER
FORM C-4:  EMPLOYEE’S CLAIM FOR COMPENSATION/ REPORT OF INITIAL TREATMENT  EMPLOYEE’S CLAIM - PROVIDE ALL INFORMATION REQUESTED   First Name  Dean Last Name  Kayla Birthdate  1985 Age  34 y.o. Sex  male Claim Number   Home Employee Address  [address]  [address] Height  1.829 m (6') Weight  71 kg (156 lb 8.4 oz) N  [id number]   Mailing Employee Address                           [address]  [address] Telephone  [phone] (home)  Primary Language Spoken  ENGLISH   Insurer  *** Third Party   N/A Employee's Occupation (Job Title) When Injury or Occupational Disease Occurred     Employer's Name   Telephone      Employer Address   City   State   Zip     Date of Injury  11/1/2019       Hour of Injury  5:00 PM Date Employer Notified  11/1/2019 Last Day of Work after Injury or Occupational Disease  11/1/2019 Supervisor to Whom Injury Reported  Tiffany Weiss   Address or Location of Accident (if applicable)  [[address] ]   What were you doing at the time of accident? (if applicable)  Cutting ceiling openings   How did this injury or occupational disease occur? Be specific and answer in detail. 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   If you believe that you have an occupational disease, when did you first have knowledge of the disability and it relationship to your employment?  N/A Witnesses to the Accident  Tiffany Weiss     Nature of Injury or Occupational Disease  Workers' Compensation  Part(s) of Body Injured or Affected  Eye (R), N/A, N/A   I certify that the above is true and correct to the best of my knowledge and that I have provided this information in order to obtain the benefits of [place] ([place] 616A [place] 616[place], inclusive or Chapter 617 of [place]RS).  I hereby authorize any physician, chiropractor, surgeon, practitioner, or other  person, any hospital, including [facility] hospital, any medical service organization, any insurance company, or other institution or organization to release to each other, any medical or other information, including benefits paid or payable, pertinent to this injury or disease, except information relative to diagnosis, treatment and/or counseling for AIDS, psychological conditions, alcohol or controlled substances, for which I must give specific authorization.  A Photostat of this authorization shall be as valid as the original.   Date   Place Employee’s Signature         THIS REPORT MUST BE COMPLETED AND MAILED WITHIN 3 WORKING DAYS OF TREATMENT   Place  [facility], EMERGENCY DEPT Name of Facility   [facility]   Date  11/1/2019 Diagnosis  (S05.01XA) Abrasion of right cornea, initial encounter Is there evidence the injured employee was under the influence of alcohol and/or another controlled substance at the time of accident?      Hour  12:06 AM Description of Injury or Disease  Abrasion of right cornea, initial encounter    Treatment    Have you advised the patient to remain off work five days or more?      X-Ray Findings    If Yes From Date    To Date      From information given by the employee, together with medical evidence, can you directly connect this injury or occupational disease as job incurred?    If No, is the employee capable of: Full Duty    Modified Duty      Is additional medical care by a physician indicated?    If Modified Duty, Specify any Limitations / Restrictions      Do you know of any previous injury or disease contributing to this condition or occupational disease?      Date  11/2/2019 Print Doctor’s Name  Shahzad Leal I certify the employer’s copy of this form was mailed on:   Address  [address] [place] [address] [id number] [phone] Insurer’s Use Only   Provider’s Tax ID Number  [id number] Telephone  Dept:  "[phone]    Doctor’s Signature    Degree             BRIEF DESCRIPTION OF RIGHTS AND BENEFITS  ([place]ursuant to [place]RS 616C.050)    Notice of Injury or Occupational Disease (Incident Report Form C-1): If an injury or occupational disease (OD) arises out of and in the course of employment, you must provide written notice to your employer as soon as practicable, but no later than 7 days after the accident or OD. Your employer shall maintain a sufficient supply of the required forms.    Claim for Compensation (Form C-4): If medical treatment is sought, the form C-4 is available at the place of initial treatment. A completed \"Claim for Compensation\" (Form C-4) must be filed within 90 days after an accident or OD. The treating physician or chiropractor must, within 3 working days after treatment, complete and mail to the employer, the employer's insurer and third-party , the Claim for Compensation.    Medical Treatment: If you require medical treatment for your on-the-job injury or OD, you may be required to select a physician or chiropractor from a list provided by your workers’ compensation insurer, if it has contracted with an Organization for Managed Care (MCO) or Preferred Provider Organization (PPO) or providers of health care. If your employer has not entered into a contract with an MCO or PPO, you may select a physician or chiropractor from the Panel of Physicians and Chiropractors. Any medical costs related to your industrial injury or OD will be paid by your insurer.    Temporary Total Disability (TTD): If your doctor has certified that you are unable to work for a period of at least 5 consecutive days, or 5 cumulative days in a 20-day period, or places restrictions on you that your employer does not accommodate, you may be entitled to TTD compensation.    Temporary Partial Disability (TPD): If the wage you receive upon reemployment is less than the compensation for TTD to which you are entitled, the " insurer may be required to pay you TPD compensation to make up the difference. TPD can only be paid for a maximum of 24 months.    Permanent Partial Disability (PPD): When your medical condition is stable and there is an indication of a PPD as a result of your injury or OD, within 30 days, your insurer must arrange for an evaluation by a rating physician or chiropractor to determine the degree of your PPD. The amount of your PPD award depends on the date of injury, the results of the PPD evaluation and your age and wage.    Permanent Total Disability (PTD): If you are medically certified by a treating physician or chiropractor as permanently and totally disabled and have been granted a PTD status by your insurer, you are entitled to receive monthly benefits not to exceed 66 2/3% of your average monthly wage. The amount of your PTD payments is subject to reduction if you previously received a PPD award.    Vocational Rehabilitation Services: You may be eligible for vocational rehabilitation services if you are unable to return to the job due to a permanent physical impairment or permanent restrictions as a result of your injury or occupational disease.    Transportation and Per Tc Reimbursement: You may be eligible for travel expenses and per tc associated with medical treatment.    Reopening: You may be able to reopen your claim if your condition worsens after claim closure.     Appeal Process: If you disagree with a written determination issued by the insurer or the insurer does not respond to your request, you may appeal to the Department of Administration, , by following the instructions contained in your determination letter. You must appeal the determination within 70 days from the date of the determination letter at [address], [address], [place], [place] [address], or [address], [address], [place] [address]. If you disagree with the  decision, you  may appeal to the Department of Administration, . You must file your appeal within 30 days from the date of the  decision letter at [address], [address], [place], [place] [address], or [address], [address], [place], [place] [address]. If you disagree with a decision of an , you may file a petition for judicial review with the District Court. You must do so within 30 days of the Appeal Officer’s decision. You may be represented by an  at your own expense or you may contact the [facility] for possible representation.    [place]  for Injured Workers (NAIW): If you disagree with a  decision, you may request that NAIW represent you without charge at an  Hearing. For information regarding denial of benefits, you may contact the [facility] at: [address], [address], [place] [address], [phone], or [address], [place] [address], [phone]    To File a Complaint with the Division: If you wish to file a complaint with the  of the Division of Industrial Relations (DIR),  please contact the Workers’ Compensation Section, [address], [address], [place], [place] [address], telephone [phone]) [phone], or [address], [address], [place], [place] [address], telephone [phone].    For assistance with Workers’ Compensation Issues: You may contact the Office of the Governor Consumer Health Assistance, [address], [address], [place], [place] [address], Toll Free 1-292.894.6083, Web site: http://govcha.[facility].nv., E-mail lucy@govcha.[facility].nv.  D-2 (rev. 06/18)    __________________________________________________________________                                    _________________  Employee Name / Signature                                                                                                                           Date

## 2019-11-01 NOTE — LETTER
FORM C-4:  EMPLOYEE’S CLAIM FOR COMPENSATION/ REPORT OF INITIAL TREATMENT  EMPLOYEE’S CLAIM - PROVIDE ALL INFORMATION REQUESTED   First Name  Dean Last Name  Kayla Birthdate  1985 Age  34 y.o. Sex  male Claim Number   Home Employee Address  [address]  [address] Height  1.829 m (6') Weight  71 kg (156 lb 8.4 oz) N  [id number]   Mailing Employee Address                           [address]  [address] Telephone  [phone] (home)  Primary Language Spoken  ENGLISH   Insurer  *** Third Party   N/A Employee's Occupation (Job Title) When Injury or Occupational Disease Occurred     Employer's Name   Telephone      Employer Address   City   State   Zip     Date of Injury  11/1/2019       Hour of Injury  5:00 PM Date Employer Notified  11/1/2019 Last Day of Work after Injury or Occupational Disease  11/1/2019 Supervisor to Whom Injury Reported  Tiffany Weiss   Address or Location of Accident (if applicable)  [[address] ]   What were you doing at the time of accident? (if applicable)  Cutting ceiling openings   How did this injury or occupational disease occur? Be specific and answer in detail. 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   If you believe that you have an occupational disease, when did you first have knowledge of the disability and it relationship to your employment?  N/A Witnesses to the Accident  Tiffany Weiss     Nature of Injury or Occupational Disease  Workers' Compensation  Part(s) of Body Injured or Affected  Eye (R), N/A, N/A   I certify that the above is true and correct to the best of my knowledge and that I have provided this information in order to obtain the benefits of [place] inclusive or Chapter 617 of [place]  I hereby authorize any physician, chiropractor, surgeon, practitioner, or other  person, any hospital, including [facility] or [facility], any medical service organization, any insurance company, or other institution or organization to release to each other, any medical or other information, including benefits paid or payable, pertinent to this injury or disease, except information relative to diagnosis, treatment and/or counseling for AIDS, psychological conditions, alcohol or controlled substances, for which I must give specific authorization.  A Photostat of this authorization shall be as valid as the original.   Date   Place Employee’s Signature         THIS REPORT MUST BE COMPLETED AND MAILED WITHIN 3 WORKING DAYS OF TREATMENT   Place  [facility], EMERGENCY DEPT Name of Facility   [facility]   Date  11/1/2019 Diagnosis  (S05.01XA) Abrasion of right cornea, initial encounter Is there evidence the injured employee was under the influence of alcohol and/or another controlled substance at the time of accident?      Hour  12:15 AM Description of Injury or Disease  Abrasion of right cornea, initial encounter    Treatment    Have you advised the patient to remain off work five days or more?      X-Ray Findings    If Yes From Date    To Date      From information given by the employee, together with medical evidence, can you directly connect this injury or occupational disease as job incurred?    If No, is the employee capable of: Full Duty    Modified Duty      Is additional medical care by a physician indicated?    If Modified Duty, Specify any Limitations / Restrictions      Do you know of any previous injury or disease contributing to this condition or occupational disease?      Date  11/2/2019 Print Doctor’s Name  Shahzad Leal I certify the employer’s copy of this form was mailed on:   Address  [address] [place] [address] [id number] [phone] Insurer’s Use Only   Provider’s Tax ID Number  [id number] Telephone  Dept:  "[phone]    Doctor’s Signature    Degree             BRIEF DESCRIPTION OF RIGHTS AND BENEFITS  (Pursuant to [place]RS 616C.050)    Notice of Injury or Occupational Disease (Incident Report Form C-1): If an injury or occupational disease (OD) arises out of and in the course of employment, you must provide written notice to your employer as soon as practicable, but no later than 7 days after the accident or OD. Your employer shall maintain a sufficient supply of the required forms.    Claim for Compensation (Form C-4): If medical treatment is sought, the form C-4 is available at the place of initial treatment. A completed \"Claim for Compensation\" (Form C-4) must be filed within 90 days after an accident or OD. The treating physician or chiropractor must, within 3 working days after treatment, complete and mail to the employer, the employer's insurer and third-party , the Claim for Compensation.    Medical Treatment: If you require medical treatment for your on-the-job injury or OD, you may be required to select a physician or chiropractor from a list provided by your workers’ compensation insurer, if it has contracted with an Organization for Managed Care (MCO) or Preferred Provider Organization (PPO) or providers of health care. If your employer has not entered into a contract with an MCO or PPO, you may select a physician or chiropractor from the Panel of Physicians and Chiropractors. Any medical costs related to your industrial injury or OD will be paid by your insurer.    Temporary Total Disability (TTD): If your doctor has certified that you are unable to work for a period of at least 5 consecutive days, or 5 cumulative days in a 20-day period, or places restrictions on you that your employer does not accommodate, you may be entitled to TTD compensation.    Temporary Partial Disability (TPD): If the wage you receive upon reemployment is less than the compensation for TTD to which you are entitled, the " insurer may be required to pay you TPD compensation to make up the difference. TPD can only be paid for a maximum of 24 months.    Permanent Partial Disability (PPD): When your medical condition is stable and there is an indication of a PPD as a result of your injury or OD, within 30 days, your insurer must arrange for an evaluation by a rating physician or chiropractor to determine the degree of your PPD. The amount of your PPD award depends on the date of injury, the results of the PPD evaluation and your age and wage.    Permanent Total Disability (PTD): If you are medically certified by a treating physician or chiropractor as permanently and totally disabled and have been granted a PTD status by your insurer, you are entitled to receive monthly benefits not to exceed 66 2/3% of your average monthly wage. The amount of your PTD payments is subject to reduction if you previously received a PPD award.    Vocational Rehabilitation Services: You may be eligible for vocational rehabilitation services if you are unable to return to the job due to a permanent physical impairment or permanent restrictions as a result of your injury or occupational disease.    Transportation and Per Tc Reimbursement: You may be eligible for travel expenses and per tc associated with medical treatment.    Reopening: You may be able to reopen your claim if your condition worsens after claim closure.     Appeal Process: If you disagree with a written determination issued by the insurer or the insurer does not respond to your request, you may appeal to the Department of Administration, , by following the instructions contained in your determination letter. You must appeal the determination within 70 days from the date of the determination letter at [address], [address], [place], [place] [address], or [address], [address], [place] [address]. If you disagree with the  decision, you  may appeal to the Department of Administration, . You must file your appeal within 30 days from the date of the  decision letter at [address], [address], [place], [place] [address], or [address], [address], [place], [place] [address]. If you disagree with a decision of an , you may file a petition for judicial review with the District Court. You must do so within 30 days of the Appeal Officer’s decision. You may be represented by an  at your own expense or you may contact the [facility] for possible representation.    [place]  for Injured Workers (NAIW): If you disagree with a  decision, you may request that NAIW represent you without charge at an  Hearing. For information regarding denial of benefits, you may contact the [facility] at: [address], [address], [place] [address], [phone], or [address], [place] [address], [phone]    To File a Complaint with the Division: If you wish to file a complaint with the  of the Division of Industrial Relations (DIR),  please contact the Workers’ Compensation Section, [address], [address], [place], [place] [address], telephone [phone]) [phone], or [address], [address], [place], [place] [address], telephone [phone].    For assistance with Workers’ Compensation Issues: You may contact the Office of the Governor Consumer Health Assistance, [address], [address], [place], [place] [address], Toll Free 1-322.171.7302, Web site: http://govcha.[facility].nv., E-mail lucy@govcha.[facility].nv.  D-2 (rev. 06/18)    __________________________________________________________________                                    _________________  Employee Name / Signature                                                                                                                           Date

## 2019-11-01 NOTE — LETTER
FORM C-4:  EMPLOYEE’S CLAIM FOR COMPENSATION/ REPORT OF INITIAL TREATMENT  EMPLOYEE’S CLAIM - PROVIDE ALL INFORMATION REQUESTED   First Name  Dean Last Name  Kayla Birthdate  1985 Age  34 y.o. Sex  male Claim Number   Home Employee Address  [address] [place]  [address] Height  1.829 m (6') Weight  71 kg (156 lb 8.4 oz) N  [id number]   Mailing Employee Address                           [address]  [address] Telephone  [phone] (home)  Primary Language Spoken  ENGLISH   Insurer  *** Third Party   N/A Employee's Occupation (Job Title) When Injury or Occupational Disease Occurred     Employer's Name   Telephone      Employer Address   City   State   Zip     Date of Injury  11/1/2019       Hour of Injury  5:00 PM Date Employer Notified  11/1/2019 Last Day of Work after Injury or Occupational Disease  11/1/2019 Supervisor to Whom Injury Reported  Tiffany Weiss   Address or Location of Accident (if applicable)  [[address] ]   What were you doing at the time of accident? (if applicable)  Cutting ceiling openings   How did this injury or occupational disease occur? Be specific and answer in detail. 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   If you believe that you have an occupational disease, when did you first have knowledge of the disability and it relationship to your employment?  N/A Witnesses to the Accident  Tiffany Weiss     Nature of Injury or Occupational Disease  Workers' Compensation  Part(s) of Body Injured or Affected  Eye (R), N/A, N/A   I certify that the above is true and correct to the best of my knowledge and that I have provided this information in order to obtain the benefits of [place], inclusive or Chapter 617 of [place]RS).  I hereby authorize any physician, chiropractor, surgeon, practitioner, or other  person, any hospital, including [facility], any medical service organization, any insurance company, or other institution or organization to release to each other, any medical or other information, including benefits paid or payable, pertinent to this injury or disease, except information relative to diagnosis, treatment and/or counseling for AIDS, psychological conditions, alcohol or controlled substances, for which I must give specific authorization.  A Photostat of this authorization shall be as valid as the original.   Date   Place Employee’s Signature         THIS REPORT MUST BE COMPLETED AND MAILED WITHIN 3 WORKING DAYS OF TREATMENT   Place  [facility], EMERGENCY DEPT Name of Facility   [facility]   Date  11/1/2019 Diagnosis  (S05.01XA) Abrasion of right cornea, initial encounter Is there evidence the injured employee was under the influence of alcohol and/or another controlled substance at the time of accident?      Hour  12:07 AM Description of Injury or Disease  Abrasion of right cornea, initial encounter    Treatment    Have you advised the patient to remain off work five days or more?      X-Ray Findings    If Yes From Date    To Date      From information given by the employee, together with medical evidence, can you directly connect this injury or occupational disease as job incurred?    If No, is the employee capable of: Full Duty    Modified Duty      Is additional medical care by a physician indicated?    If Modified Duty, Specify any Limitations / Restrictions      Do you know of any previous injury or disease contributing to this condition or occupational disease?      Date  11/2/2019 Print Doctor’s Name  Shahzad Leal I certify the employer’s copy of this form was mailed on:   Address  [address] [place] [id number] [phone] Insurer’s Use Only   Provider’s Tax ID Number  [id number] Telephone  Dept:  "[phone]    Doctor’s Signature    Degree             BRIEF DESCRIPTION OF RIGHTS AND BENEFITS  ([place]ursuant to [place]RS 616C.050)    Notice of Injury or Occupational Disease (Incident Report Form C-1): If an injury or occupational disease (OD) arises out of and in the course of employment, you must provide written notice to your employer as soon as practicable, but no later than 7 days after the accident or OD. Your employer shall maintain a sufficient supply of the required forms.    Claim for Compensation (Form C-4): If medical treatment is sought, the form C-4 is available at the place of initial treatment. A completed \"Claim for Compensation\" (Form C-4) must be filed within 90 days after an accident or OD. The treating physician or chiropractor must, within 3 working days after treatment, complete and mail to the employer, the employer's insurer and third-party , the Claim for Compensation.    Medical Treatment: If you require medical treatment for your on-the-job injury or OD, you may be required to select a physician or chiropractor from a list provided by your workers’ compensation insurer, if it has contracted with an Organization for Managed Care (MCO) or Preferred Provider Organization (PPO) or providers of health care. If your employer has not entered into a contract with an MCO or PPO, you may select a physician or chiropractor from the Panel of Physicians and Chiropractors. Any medical costs related to your industrial injury or OD will be paid by your insurer.    Temporary Total Disability (TTD): If your doctor has certified that you are unable to work for a period of at least 5 consecutive days, or 5 cumulative days in a 20-day period, or places restrictions on you that your employer does not accommodate, you may be entitled to TTD compensation.    Temporary Partial Disability (TPD): If the wage you receive upon reemployment is less than the compensation for TTD to which you are entitled, the " insurer may be required to pay you TPD compensation to make up the difference. TPD can only be paid for a maximum of 24 months.    Permanent Partial Disability (PPD): When your medical condition is stable and there is an indication of a PPD as a result of your injury or OD, within 30 days, your insurer must arrange for an evaluation by a rating physician or chiropractor to determine the degree of your PPD. The amount of your PPD award depends on the date of injury, the results of the PPD evaluation and your age and wage.    Permanent Total Disability (PTD): If you are medically certified by a treating physician or chiropractor as permanently and totally disabled and have been granted a PTD status by your insurer, you are entitled to receive monthly benefits not to exceed 66 2/3% of your average monthly wage. The amount of your PTD payments is subject to reduction if you previously received a PPD award.    Vocational Rehabilitation Services: You may be eligible for vocational rehabilitation services if you are unable to return to the job due to a permanent physical impairment or permanent restrictions as a result of your injury or occupational disease.    Transportation and Per Tc Reimbursement: You may be eligible for travel expenses and per tc associated with medical treatment.    Reopening: You may be able to reopen your claim if your condition worsens after claim closure.     Appeal Process: If you disagree with a written determination issued by the insurer or the insurer does not respond to your request, you may appeal to the Department of Administration, , by following the instructions contained in your determination letter. You must appeal the determination within 70 days from the date of the determination letter at [address], [address], [place], [place] [address], or [address], [place] [address]. If you disagree with the  decision, you  may appeal to the Department of Administration, . You must file your appeal within 30 days from the date of the  decision letter at [address], [address], [place], [place] [address], or [address], [place], [place] [address]. If you disagree with a decision of an , you may file a petition for judicial review with the District Court. You must do so within 30 days of the Appeal Officer’s decision. You may be represented by an  at your own expense or you may contact the [facility] for possible representation.    [place]  for Injured Workers (NAIW): If you disagree with a  decision, you may request that NAIW represent you without charge at an  Hearing. For information regarding denial of benefits, you may contact the [facility] at: [address], [address], [place] [address], [phone], or [address], [place] [address], [phone]    To File a Complaint with the Division: If you wish to file a complaint with the  of the Division of Industrial Relations (DIR),  please contact the Workers’ Compensation Section, [address], [address], [place], [place] [address], telephone [phone]) [phone], or [address], [address], [place], [place] [address], telephone [phone].    For assistance with Workers’ Compensation Issues: You may contact the Office of the Governor Consumer Health Assistance, [address], [address], [place], [place] [address], Toll Free 1-180.982.7590, Web site: http://govcha.[facility].nv., E-mail lucy@govcha.[facility].nv.  D-2 (rev. 06/18)    __________________________________________________________________                                    _________________  Employee Name / Signature                                                                                                                           Date

## 2019-11-01 NOTE — LETTER
FORM C-4:  EMPLOYEE’S CLAIM FOR COMPENSATION/ REPORT OF INITIAL TREATMENT  EMPLOYEE’S CLAIM - PROVIDE ALL INFORMATION REQUESTED   First Name  Dean Last Name  Kayla Birthdate  1985 Age  34 y.o. Sex  male Claim Number   Home Employee Address  [address] [place]  [address] Height  1.829 m (6') Weight  71 kg (156 lb 8.4 oz) [facility]     Mailing Employee Address                           [address]  [address] Telephone  [phone] (home)  Primary Language Spoken  ENGLISH   Insurer   Third Party    Employee's Occupation (Job Title) When Injury or Occupational Disease Occurred  [employer]   Employer's Name   Telephone      Employer Address   City   State   Zip     Date of Injury  11/1/2019       Hour of Injury  5:00 PM Date Employer Notified  11/1/2019 Last Day of Work after Injury or Occupational Disease  11/1/2019 Supervisor to Whom Injury Reported  Tiffany Weiss   Address or Location of Accident (if applicable)  [[address] ]   What were you doing at the time of accident? (if applicable)  Cutting ceiling openings   How did this injury or occupational disease occur? Be specific and answer in detail. 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   If you believe that you have an occupational disease, when did you first have knowledge of the disability and it relationship to your employment?  N/A Witnesses to the Accident  Tiffany Weiss     Nature of Injury or Occupational Disease  Workers' Compensation  Part(s) of Body Injured or Affected  Eye (R), N/A, N/A   I certify that the above is true and correct to the best of my knowledge and that I have provided this information in order to obtain the benefits of [place] inclusive or Chapter 617 of [place]RS).  I hereby authorize any physician, chiropractor, surgeon, practitioner, or other  person, any hospital, including [facility] or [facility], any medical service organization, any insurance company, or other institution or organization to release to each other, any medical or other information, including benefits paid or payable, pertinent to this injury or disease, except information relative to diagnosis, treatment and/or counseling for AIDS, psychological conditions, alcohol or controlled substances, for which I must give specific authorization.  A Photostat of this authorization shall be as valid as the original.   Date   Place Employee’s Signature         THIS REPORT MUST BE COMPLETED AND MAILED WITHIN 3 WORKING DAYS OF TREATMENT   Place  [facility], EMERGENCY DEPT Name of Facility   [facility]   Date  11/1/2019 Diagnosis  (S05.01XA) Abrasion of right cornea, initial encounter Is there evidence the injured employee was under the influence of alcohol and/or another controlled substance at the time of accident?      Hour  11:52 PM Description of Injury or Disease  Abrasion of right cornea, initial encounter    Treatment    Have you advised the patient to remain off work five days or more?      X-Ray Findings    If Yes From Date    To Date      From information given by the employee, together with medical evidence, can you directly connect this injury or occupational disease as job incurred?    If No, is the employee capable of: Full Duty    Modified Duty      Is additional medical care by a physician indicated?    If Modified Duty, Specify any Limitations / Restrictions      Do you know of any previous injury or disease contributing to this condition or occupational disease?      Date  11/1/2019 Print Doctor’s Name  Shahzad Leal I certify the employer’s copy of this form was mailed on:   Address  [address] [place] [id number] [phone] Insurer’s Use Only   Provider’s Tax ID Number  [id number] Telephone  Dept:  "[phone]    Doctor’s Signature    Degree             BRIEF DESCRIPTION OF RIGHTS AND BENEFITS  (Pursuant to [place]RS 616C.050)    Notice of Injury or Occupational Disease (Incident Report Form C-1): If an injury or occupational disease (OD) arises out of and in the course of employment, you must provide written notice to your employer as soon as practicable, but no later than 7 days after the accident or OD. Your employer shall maintain a sufficient supply of the required forms.    Claim for Compensation (Form C-4): If medical treatment is sought, the form C-4 is available at the place of initial treatment. A completed \"Claim for Compensation\" (Form C-4) must be filed within 90 days after an accident or OD. The treating physician or chiropractor must, within 3 working days after treatment, complete and mail to the employer, the employer's insurer and third-party , the Claim for Compensation.    Medical Treatment: If you require medical treatment for your on-the-job injury or OD, you may be required to select a physician or chiropractor from a list provided by your workers’ compensation insurer, if it has contracted with an Organization for Managed Care (MCO) or Preferred Provider Organization (PPO) or providers of health care. If your employer has not entered into a contract with an MCO or PPO, you may select a physician or chiropractor from the Panel of Physicians and Chiropractors. Any medical costs related to your industrial injury or OD will be paid by your insurer.    Temporary Total Disability (TTD): If your doctor has certified that you are unable to work for a period of at least 5 consecutive days, or 5 cumulative days in a 20-day period, or places restrictions on you that your employer does not accommodate, you may be entitled to TTD compensation.    Temporary Partial Disability (TPD): If the wage you receive upon reemployment is less than the compensation for TTD to which you are entitled, the " insurer may be required to pay you TPD compensation to make up the difference. TPD can only be paid for a maximum of 24 months.    Permanent Partial Disability (PPD): When your medical condition is stable and there is an indication of a PPD as a result of your injury or OD, within 30 days, your insurer must arrange for an evaluation by a rating physician or chiropractor to determine the degree of your PPD. The amount of your PPD award depends on the date of injury, the results of the PPD evaluation and your age and wage.    Permanent Total Disability (PTD): If you are medically certified by a treating physician or chiropractor as permanently and totally disabled and have been granted a PTD status by your insurer, you are entitled to receive monthly benefits not to exceed 66 2/3% of your average monthly wage. The amount of your PTD payments is subject to reduction if you previously received a PPD award.    Vocational Rehabilitation Services: You may be eligible for vocational rehabilitation services if you are unable to return to the job due to a permanent physical impairment or permanent restrictions as a result of your injury or occupational disease.    Transportation and Per Tc Reimbursement: You may be eligible for travel expenses and per tc associated with medical treatment.    Reopening: You may be able to reopen your claim if your condition worsens after claim closure.     Appeal Process: If you disagree with a written determination issued by the insurer or the insurer does not respond to your request, you may appeal to the Department of Administration, , by following the instructions contained in your determination letter. You must appeal the determination within 70 days from the date of the determination letter at [address], [address], [place], [place] [address], or [address], [address], [place] [address]. If you disagree with the  decision, you  may appeal to the Department of Administration, . You must file your appeal within 30 days from the date of the  decision letter at [address], [address], [place], [place] [address], or [address], [place], [place] [address]. If you disagree with a decision of an , you may file a petition for judicial review with the District Court. You must do so within 30 days of the Appeal Officer’s decision. You may be represented by an  at your own expense or you may contact the [facility] for possible representation.    [place]  for Injured Workers (NAIW): If you disagree with a  decision, you may request that NAIW represent you without charge at an  Hearing. For information regarding denial of benefits, you may contact the [facility] at: [address], [address], [place] [address], [phone], or [address], [place] [address], [phone]    To File a Complaint with the Division: If you wish to file a complaint with the  of the Division of Industrial Relations (DIR),  please contact the Workers’ Compensation Section, [address], [address], [place], [place] [address], telephone [phone]) [phone], or [address], [address], [place], [place] [address], telephone [phone].    For assistance with Workers’ Compensation Issues: You may contact the Office of the Governor Consumer Health Assistance, [address], [address], [place], [place] [address], Toll Free 1-843.807.3442, Web site: http://govcha.[facility].nv., E-mail lucy@govcha.[facility].nv.  D-2 (rev. 06/18)    __________________________________________________________________                                    _________________  Employee Name / Signature                                                                                                                           Date

## 2019-11-01 NOTE — LETTER
FORM C-4:  EMPLOYEE’S CLAIM FOR COMPENSATION/ REPORT OF INITIAL TREATMENT  EMPLOYEE’S CLAIM - PROVIDE ALL INFORMATION REQUESTED   First Name  Dean Last Name  Kayla Birthdate  1985 Age  34 y.o. Sex  male Claim Number   Home Employee Address  [address] [place]  [address] Height  1.829 m (6') Weight  71 kg (156 lb 8.4 oz) N  [id number]   Mailing Employee Address                           [address]  [address] Telephone  [phone] (home)  Primary Language Spoken  ENGLISH   Insurer  *** Third Party   N/A Employee's Occupation (Job Title) When Injury or Occupational Disease Occurred     Employer's Name   Telephone      Employer Address   City   State   Zip     Date of Injury  11/1/2019       Hour of Injury  5:00 PM Date Employer Notified  11/1/2019 Last Day of Work after Injury or Occupational Disease  11/1/2019 Supervisor to Whom Injury Reported  Tiffany Weiss   Address or Location of Accident (if applicable)  [[address] ]   What were you doing at the time of accident? (if applicable)  Cutting ceiling openings   How did this injury or occupational disease occur? Be specific and answer in detail. 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   If you believe that you have an occupational disease, when did you first have knowledge of the disability and it relationship to your employment?  N/A Witnesses to the Accident  Tiffany Weiss     Nature of Injury or Occupational Disease  Workers' Compensation  Part(s) of Body Injured or Affected  Eye (R), N/A, N/A   I certify that the above is true and correct to the best of my knowledge and that I have provided this information in order to obtain the benefits of [place]’s [place] ([place]A [place], inclusive or Chapter 617 of [place]RS).  I hereby authorize any physician, chiropractor, surgeon, practitioner, or other  person, any hospital, including [facility] or [facility], any medical service organization, any insurance company, or other institution or organization to release to each other, any medical or other information, including benefits paid or payable, pertinent to this injury or disease, except information relative to diagnosis, treatment and/or counseling for AIDS, psychological conditions, alcohol or controlled substances, for which I must give specific authorization.  A Photostat of this authorization shall be as valid as the original.   Date   Place Employee’s Signature         THIS REPORT MUST BE COMPLETED AND MAILED WITHIN 3 WORKING DAYS OF TREATMENT   Place  [facility], EMERGENCY DEPT Name of Facility   [facility]   Date  11/1/2019 Diagnosis  (S05.01XA) Abrasion of right cornea, initial encounter Is there evidence the injured employee was under the influence of alcohol and/or another controlled substance at the time of accident?      Hour  12:13 AM Description of Injury or Disease  Abrasion of right cornea, initial encounter    Treatment    Have you advised the patient to remain off work five days or more?      X-Ray Findings    If Yes From Date    To Date      From information given by the employee, together with medical evidence, can you directly connect this injury or occupational disease as job incurred?    If No, is the employee capable of: Full Duty    Modified Duty      Is additional medical care by a physician indicated?    If Modified Duty, Specify any Limitations / Restrictions      Do you know of any previous injury or disease contributing to this condition or occupational disease?      Date  11/2/2019 Print Doctor’s Name  Shahzad Leal I certify the employer’s copy of this form was mailed on:   Address  [address] [place] [id number] [phone] Insurer’s Use Only   Provider’s Tax ID Number  [id number] Telephone  Dept:  "[phone]    Doctor’s Signature    Degree             BRIEF DESCRIPTION OF RIGHTS AND BENEFITS  ([place]ursuant to [place]RS 616C.050)    Notice of Injury or Occupational Disease (Incident Report Form C-1): If an injury or occupational disease (OD) arises out of and in the course of employment, you must provide written notice to your employer as soon as practicable, but no later than 7 days after the accident or OD. Your employer shall maintain a sufficient supply of the required forms.    Claim for Compensation (Form C-4): If medical treatment is sought, the form C-4 is available at the place of initial treatment. A completed \"Claim for Compensation\" (Form C-4) must be filed within 90 days after an accident or OD. The treating physician or chiropractor must, within 3 working days after treatment, complete and mail to the employer, the employer's insurer and third-party , the Claim for Compensation.    Medical Treatment: If you require medical treatment for your on-the-job injury or OD, you may be required to select a physician or chiropractor from a list provided by your workers’ compensation insurer, if it has contracted with an Organization for Managed Care (MCO) or Preferred Provider Organization (PPO) or providers of health care. If your employer has not entered into a contract with an MCO or PPO, you may select a physician or chiropractor from the Panel of Physicians and Chiropractors. Any medical costs related to your industrial injury or OD will be paid by your insurer.    Temporary Total Disability (TTD): If your doctor has certified that you are unable to work for a period of at least 5 consecutive days, or 5 cumulative days in a 20-day period, or places restrictions on you that your employer does not accommodate, you may be entitled to TTD compensation.    Temporary Partial Disability (TPD): If the wage you receive upon reemployment is less than the compensation for TTD to which you are entitled, the " insurer may be required to pay you TPD compensation to make up the difference. TPD can only be paid for a maximum of 24 months.    Permanent Partial Disability (PPD): When your medical condition is stable and there is an indication of a PPD as a result of your injury or OD, within 30 days, your insurer must arrange for an evaluation by a rating physician or chiropractor to determine the degree of your PPD. The amount of your PPD award depends on the date of injury, the results of the PPD evaluation and your age and wage.    Permanent Total Disability (PTD): If you are medically certified by a treating physician or chiropractor as permanently and totally disabled and have been granted a PTD status by your insurer, you are entitled to receive monthly benefits not to exceed 66 2/3% of your average monthly wage. The amount of your PTD payments is subject to reduction if you previously received a PPD award.    Vocational Rehabilitation Services: You may be eligible for vocational rehabilitation services if you are unable to return to the job due to a permanent physical impairment or permanent restrictions as a result of your injury or occupational disease.    Transportation and Per Tc Reimbursement: You may be eligible for travel expenses and per tc associated with medical treatment.    Reopening: You may be able to reopen your claim if your condition worsens after claim closure.     Appeal Process: If you disagree with a written determination issued by the insurer or the insurer does not respond to your request, you may appeal to the Department of Administration, , by following the instructions contained in your determination letter. You must appeal the determination within 70 days from the date of the determination letter at [address], [address], [place], [place] [address], or [address], [address], [place] [address]. If you disagree with the  decision, you  may appeal to the Department of Administration, . You must file your appeal within 30 days from the date of the  decision letter at [address], [address], [place], [place] [address], or [address], [place], [place] [address]. If you disagree with a decision of an , you may file a petition for judicial review with the District Court. You must do so within 30 days of the Appeal Officer’s decision. You may be represented by an  at your own expense or you may contact the [facility] for possible representation.    [place]  for Injured Workers (NAIW): If you disagree with a  decision, you may request that NAIW represent you without charge at an  Hearing. For information regarding denial of benefits, you may contact the [facility] at: [address], [address], [place] [address], [phone], or [address], [place] [address], [phone]    To File a Complaint with the Division: If you wish to file a complaint with the  of the Division of Industrial Relations (DIR),  please contact the Workers’ Compensation Section, [address], [address], [place], [place] [address], telephone (064) [phone], or [address], [address], [place], [place] [address], telephone [phone].    For assistance with Workers’ Compensation Issues: You may contact the Office of the Governor Consumer Health Assistance, [address], [address], [place], [place] [address], Toll Free 1-874.497.9059, Web site: http://govcha.[facility].[place]., E-mail lucy@govcha.[facility].  D-2 (rev. 06/18)    __________________________________________________________________                                    _________________  Employee Name / Signature                                                                                                                           Date

## 2019-11-01 NOTE — LETTER
FORM C-4:  EMPLOYEE’S CLAIM FOR COMPENSATION/ REPORT OF INITIAL TREATMENT  EMPLOYEE’S CLAIM - PROVIDE ALL INFORMATION REQUESTED   First Name  Dean Last Name  Kayla Birthdate  1985 Age  34 y.o. Sex  male Claim Number   Home Employee Address  [address] [place]  [address] Height  1.829 m (6') Weight  71 kg (156 lb 8.4 oz) N  [id number]   Mailing Employee Address                           [address]  [address] Telephone  [phone] (home)  Primary Language Spoken  ENGLISH   Insurer  *** Third Party   N/A Employee's Occupation (Job Title) When Injury or Occupational Disease Occurred     Employer's Name   Telephone      Employer Address   City   State   Zip     Date of Injury  11/1/2019       Hour of Injury  5:00 PM Date Employer Notified  11/1/2019 Last Day of Work after Injury or Occupational Disease  11/1/2019 Supervisor to Whom Injury Reported  Tiffany Weiss   Address or Location of Accident (if applicable)  [[address] ]   What were you doing at the time of accident? (if applicable)  Cutting ceiling openings   How did this injury or occupational disease occur? Be specific and answer in detail. 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   If you believe that you have an occupational disease, when did you first have knowledge of the disability and it relationship to your employment?  N/A Witnesses to the Accident  Tiffany Weiss     Nature of Injury or Occupational Disease  Workers' Compensation  Part(s) of Body Injured or Affected  Eye (R), N/A, N/A   I certify that the above is true and correct to the best of my knowledge and that I have provided this information in order to obtain the benefits of [place] inclusive or Chapter 617 of [place]  I hereby authorize any physician, chiropractor, surgeon, practitioner, or other  person, any hospital, including [facility] hospital, any medical service organization, any insurance company, or other institution or organization to release to each other, any medical or other information, including benefits paid or payable, pertinent to this injury or disease, except information relative to diagnosis, treatment and/or counseling for AIDS, psychological conditions, alcohol or controlled substances, for which I must give specific authorization.  A Photostat of this authorization shall be as valid as the original.   Date   Place Employee’s Signature         THIS REPORT MUST BE COMPLETED AND MAILED WITHIN 3 WORKING DAYS OF TREATMENT   Place  [facility], EMERGENCY DEPT Name of Facility   [facility]   Date  11/1/2019 Diagnosis  (S05.01XA) Abrasion of right cornea, initial encounter Is there evidence the injured employee was under the influence of alcohol and/or another controlled substance at the time of accident?      Hour  12:04 AM Description of Injury or Disease  Abrasion of right cornea, initial encounter    Treatment    Have you advised the patient to remain off work five days or more?      X-Ray Findings    If Yes From Date    To Date      From information given by the employee, together with medical evidence, can you directly connect this injury or occupational disease as job incurred?    If No, is the employee capable of: Full Duty    Modified Duty      Is additional medical care by a physician indicated?    If Modified Duty, Specify any Limitations / Restrictions      Do you know of any previous injury or disease contributing to this condition or occupational disease?      Date  11/2/2019 Print Doctor’s Name  Shahzad Leal I certify the employer’s copy of this form was mailed on:   Address  [address] [place] [address] [id number] [phone] Insurer’s Use Only   Provider’s Tax ID Number  [id number] Telephone  Dept:  "[phone]    Doctor’s Signature    Degree             BRIEF DESCRIPTION OF RIGHTS AND BENEFITS  ([place]ursuant to [place]RS 616C.050)    Notice of Injury or Occupational Disease (Incident Report Form C-1): If an injury or occupational disease (OD) arises out of and in the course of employment, you must provide written notice to your employer as soon as practicable, but no later than 7 days after the accident or OD. Your employer shall maintain a sufficient supply of the required forms.    Claim for Compensation (Form C-4): If medical treatment is sought, the form C-4 is available at the place of initial treatment. A completed \"Claim for Compensation\" (Form C-4) must be filed within 90 days after an accident or OD. The treating physician or chiropractor must, within 3 working days after treatment, complete and mail to the employer, the employer's insurer and third-party , the Claim for Compensation.    Medical Treatment: If you require medical treatment for your on-the-job injury or OD, you may be required to select a physician or chiropractor from a list provided by your workers’ compensation insurer, if it has contracted with an Organization for Managed Care (MCO) or Preferred Provider Organization (PPO) or providers of health care. If your employer has not entered into a contract with an MCO or PPO, you may select a physician or chiropractor from the Panel of Physicians and Chiropractors. Any medical costs related to your industrial injury or OD will be paid by your insurer.    Temporary Total Disability (TTD): If your doctor has certified that you are unable to work for a period of at least 5 consecutive days, or 5 cumulative days in a 20-day period, or places restrictions on you that your employer does not accommodate, you may be entitled to TTD compensation.    Temporary Partial Disability (TPD): If the wage you receive upon reemployment is less than the compensation for TTD to which you are entitled, the " insurer may be required to pay you TPD compensation to make up the difference. TPD can only be paid for a maximum of 24 months.    Permanent Partial Disability (PPD): When your medical condition is stable and there is an indication of a PPD as a result of your injury or OD, within 30 days, your insurer must arrange for an evaluation by a rating physician or chiropractor to determine the degree of your PPD. The amount of your PPD award depends on the date of injury, the results of the PPD evaluation and your age and wage.    Permanent Total Disability (PTD): If you are medically certified by a treating physician or chiropractor as permanently and totally disabled and have been granted a PTD status by your insurer, you are entitled to receive monthly benefits not to exceed 66 2/3% of your average monthly wage. The amount of your PTD payments is subject to reduction if you previously received a PPD award.    Vocational Rehabilitation Services: You may be eligible for vocational rehabilitation services if you are unable to return to the job due to a permanent physical impairment or permanent restrictions as a result of your injury or occupational disease.    Transportation and Per Tc Reimbursement: You may be eligible for travel expenses and per tc associated with medical treatment.    Reopening: You may be able to reopen your claim if your condition worsens after claim closure.     Appeal Process: If you disagree with a written determination issued by the insurer or the insurer does not respond to your request, you may appeal to the Department of Administration, , by following the instructions contained in your determination letter. You must appeal the determination within 70 days from the date of the determination letter at [address], [address], [place], [place] [address], or [address], [address], [place] [address]. If you disagree with the  decision, you  may appeal to the Department of Administration, . You must file your appeal within 30 days from the date of the  decision letter at [address], [address], [place], [place] [address], or [address], [place], [place] [address]. If you disagree with a decision of an , you may file a petition for judicial review with the District Court. You must do so within 30 days of the Appeal Officer’s decision. You may be represented by an  at your own expense or you may contact the [facility] for possible representation.    [place]  for Injured Workers (NAIW): If you disagree with a  decision, you may request that NAIW represent you without charge at an  Hearing. For information regarding denial of benefits, you may contact the [facility] at: [address], [address], [place] [address], [phone], or [address], [address], [place] [address], [phone]    To File a Complaint with the Division: If you wish to file a complaint with the  of the Division of Industrial Relations (DIR),  please contact the Workers’ Compensation Section, [address], [address], [place], [place] [address], telephone [phone]) [phone], or [address], [address], [place], [place] [address], telephone [phone].    For assistance with Workers’ Compensation Issues: You may contact the Office of the Governor Consumer Health Assistance, [address], [address], [place], [place] [address], Toll Free 1-851.508.6426, Web site: http://govcha.[facility].nv., E-mail lucy@govcha.[facility].nv.  D-2 (rev. 06/18)    __________________________________________________________________                                    _________________  Employee Name / Signature                                                                                                                           Date

## 2019-11-01 NOTE — LETTER
FORM C-4:  EMPLOYEE’S CLAIM FOR COMPENSATION/ REPORT OF INITIAL TREATMENT  EMPLOYEE’S CLAIM - PROVIDE ALL INFORMATION REQUESTED   First Name  Dean Last Name  Kayla Birthdate  1985 Age  34 y.o. Sex  male Claim Number   Home Employee Address  [address] [place]  [address] Height  1.829 m (6') Weight  71 kg (156 lb 8.4 oz) N  [id number]   Mailing Employee Address                           [address]  [address] Telephone  [phone] (home)  Primary Language Spoken  ENGLISH   Insurer  *** Third Party   N/A Employee's Occupation (Job Title) When Injury or Occupational Disease Occurred     Employer's Name   Telephone      Employer Address   City   State   Zip     Date of Injury  11/1/2019       Hour of Injury  5:00 PM Date Employer Notified  11/1/2019 Last Day of Work after Injury or Occupational Disease  11/1/2019 Supervisor to Whom Injury Reported  Tiffany Weiss   Address or Location of Accident (if applicable)  [[address] ]   What were you doing at the time of accident? (if applicable)  Cutting ceiling openings   How did this injury or occupational disease occur? Be specific and answer in detail. 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   If you believe that you have an occupational disease, when did you first have knowledge of the disability and it relationship to your employment?  N/A Witnesses to the Accident  Tiffany Weiss     Nature of Injury or Occupational Disease  Workers' Compensation  Part(s) of Body Injured or Affected  Eye (R), N/A, N/A   I certify that the above is true and correct to the best of my knowledge and that I have provided this information in order to obtain the benefits of [place]’s [place] ([place] 616A [place], inclusive or Chapter 617 of [place]RS).  I hereby authorize any physician, chiropractor, surgeon, practitioner, or other  person, any hospital, including [facility] or [facility] hospital, any medical service organization, any insurance company, or other institution or organization to release to each other, any medical or other information, including benefits paid or payable, pertinent to this injury or disease, except information relative to diagnosis, treatment and/or counseling for AIDS, psychological conditions, alcohol or controlled substances, for which I must give specific authorization.  A Photostat of this authorization shall be as valid as the original.   Date   Place Employee’s Signature         THIS REPORT MUST BE COMPLETED AND MAILED WITHIN 3 WORKING DAYS OF TREATMENT   Place  [facility], EMERGENCY DEPT Name of Facility   [facility]   Date  11/1/2019 Diagnosis  (S05.01XA) Abrasion of right cornea, initial encounter Is there evidence the injured employee was under the influence of alcohol and/or another controlled substance at the time of accident?      Hour  12:12 AM Description of Injury or Disease  Abrasion of right cornea, initial encounter    Treatment    Have you advised the patient to remain off work five days or more?      X-Ray Findings    If Yes From Date    To Date      From information given by the employee, together with medical evidence, can you directly connect this injury or occupational disease as job incurred?    If No, is the employee capable of: Full Duty    Modified Duty      Is additional medical care by a physician indicated?    If Modified Duty, Specify any Limitations / Restrictions      Do you know of any previous injury or disease contributing to this condition or occupational disease?      Date  11/2/2019 Print Doctor’s Name  Shahzad Leal I certify the employer’s copy of this form was mailed on:   Address  [address] [place] [address] [id number] [phone] Insurer’s Use Only   Provider’s Tax ID Number  [id number] Telephone  Dept:  "[phone]    Doctor’s Signature    Degree             BRIEF DESCRIPTION OF RIGHTS AND BENEFITS  ([place]ursuant to [place]RS 616C.050)    Notice of Injury or Occupational Disease (Incident Report Form C-1): If an injury or occupational disease (OD) arises out of and in the course of employment, you must provide written notice to your employer as soon as practicable, but no later than 7 days after the accident or OD. Your employer shall maintain a sufficient supply of the required forms.    Claim for Compensation (Form C-4): If medical treatment is sought, the form C-4 is available at the place of initial treatment. A completed \"Claim for Compensation\" (Form C-4) must be filed within 90 days after an accident or OD. The treating physician or chiropractor must, within 3 working days after treatment, complete and mail to the employer, the employer's insurer and third-party , the Claim for Compensation.    Medical Treatment: If you require medical treatment for your on-the-job injury or OD, you may be required to select a physician or chiropractor from a list provided by your workers’ compensation insurer, if it has contracted with an Organization for Managed Care (MCO) or Preferred Provider Organization (PPO) or providers of health care. If your employer has not entered into a contract with an MCO or PPO, you may select a physician or chiropractor from the Panel of Physicians and Chiropractors. Any medical costs related to your industrial injury or OD will be paid by your insurer.    Temporary Total Disability (TTD): If your doctor has certified that you are unable to work for a period of at least 5 consecutive days, or 5 cumulative days in a 20-day period, or places restrictions on you that your employer does not accommodate, you may be entitled to TTD compensation.    Temporary Partial Disability (TPD): If the wage you receive upon reemployment is less than the compensation for TTD to which you are entitled, the " insurer may be required to pay you TPD compensation to make up the difference. TPD can only be paid for a maximum of 24 months.    Permanent Partial Disability (PPD): When your medical condition is stable and there is an indication of a PPD as a result of your injury or OD, within 30 days, your insurer must arrange for an evaluation by a rating physician or chiropractor to determine the degree of your PPD. The amount of your PPD award depends on the date of injury, the results of the PPD evaluation and your age and wage.    Permanent Total Disability (PTD): If you are medically certified by a treating physician or chiropractor as permanently and totally disabled and have been granted a PTD status by your insurer, you are entitled to receive monthly benefits not to exceed 66 2/3% of your average monthly wage. The amount of your PTD payments is subject to reduction if you previously received a PPD award.    Vocational Rehabilitation Services: You may be eligible for vocational rehabilitation services if you are unable to return to the job due to a permanent physical impairment or permanent restrictions as a result of your injury or occupational disease.    Transportation and Per Tc Reimbursement: You may be eligible for travel expenses and per tc associated with medical treatment.    Reopening: You may be able to reopen your claim if your condition worsens after claim closure.     Appeal Process: If you disagree with a written determination issued by the insurer or the insurer does not respond to your request, you may appeal to the Department of Administration, , by following the instructions contained in your determination letter. You must appeal the determination within 70 days from the date of the determination letter at [address], [address], [place], [place] [address], or [address], [address], [place] [address]. If you disagree with the  decision, you  may appeal to the Department of Administration, . You must file your appeal within 30 days from the date of the  decision letter at [address], [address], [place], [place] [address], or [address], [place], [place] [address]. If you disagree with a decision of an , you may file a petition for judicial review with the District Court. You must do so within 30 days of the Appeal Officer’s decision. You may be represented by an  at your own expense or you may contact the [facility] for possible representation.    [place]  for In[place] Workers ([place]IW): If you disagree with a  decision, you may request that NAIW represent you without charge at an  Hearing. For information regarding denial of benefits, you may contact the [facility] at: [address], [address], [place] [address], [phone], or [address], [address], [place] [address], [phone]    To File a Complaint with the Division: If you wish to file a complaint with the  of the Division of Industrial Relations (DIR),  please contact the Workers’ Compensation Section, [address], [address], [place], [place] [address], telephone (859) [phone], or [address], [place], [place] [address], telephone [phone].    For assistance with Workers’ Compensation Issues: You may contact the Office of the Governor Consumer Health Assistance, [address], [address], [place], [place] [address], Toll Free 1-518.826.6766, Web site: http://govcha.[facility].nv., E-mail lucy@govcha.[facility].nv.  D-2 (rev. 06/18)    __________________________________________________________________                                    _________________  Employee Name / Signature                                                                                                                           Date

## 2019-11-01 NOTE — LETTER
FORM C-4:  EMPLOYEE’S CLAIM FOR COMPENSATION/ REPORT OF INITIAL TREATMENT  EMPLOYEE’S CLAIM - PROVIDE ALL INFORMATION REQUESTED   First Name  Dean Last Name  Kayla Birthdate  1985 Age  34 y.o. Sex  male Claim Number   Home Employee Address  [address]  [address] Height  1.829 m (6') Weight  71 kg (156 lb 8.4 oz) N  [id number]   Mailing Employee Address                           [address]  [address] Telephone  [phone] (home)  Primary Language Spoken  ENGLISH   Insurer  *** Third Party   N/A Employee's Occupation (Job Title) When Injury or Occupational Disease Occurred     Employer's Name   Telephone      Employer Address   City   State   Zip     Date of Injury  11/1/2019       Hour of Injury  5:00 PM Date Employer Notified  11/1/2019 Last Day of Work after Injury or Occupational Disease  11/1/2019 Supervisor to Whom Injury Reported  Tiffany Weiss   Address or Location of Accident (if applicable)  [[address] ]   What were you doing at the time of accident? (if applicable)  Cutting ceiling openings   How did this injury or occupational disease occur? Be specific and answer in detail. 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   If you believe that you have an occupational disease, when did you first have knowledge of the disability and it relationship to your employment?  N/A Witnesses to the Accident  Tiffany Weiss     Nature of Injury or Occupational Disease  Workers' Compensation  Part(s) of Body Injured or Affected  Eye (R), N/A, N/A   I certify that the above is true and correct to the best of my knowledge and that I have provided this information in order to obtain the benefits of [place] inclusive or Chapter 617 of [place]  I hereby authorize any physician, chiropractor, surgeon, practitioner, or other  person, any hospital, including [facility] or [facility] hospital, any medical service organization, any insurance company, or other institution or organization to release to each other, any medical or other information, including benefits paid or payable, pertinent to this injury or disease, except information relative to diagnosis, treatment and/or counseling for AIDS, psychological conditions, alcohol or controlled substances, for which I must give specific authorization.  A Photostat of this authorization shall be as valid as the original.   Date   Place Employee’s Signature         THIS REPORT MUST BE COMPLETED AND MAILED WITHIN 3 WORKING DAYS OF TREATMENT   Place  [facility] [place], EMERGENCY DEPT Name of Facility   [facility] [place]   Date  11/1/2019 Diagnosis  (S05.01XA) Abrasion of right cornea, initial encounter Is there evidence the injured employee was under the influence of alcohol and/or another controlled substance at the time of accident?      Hour  12:19 AM Description of Injury or Disease  Abrasion of right cornea, initial encounter    Treatment    Have you advised the patient to remain off work five days or more?      X-Ray Findings    If Yes From Date    To Date      From information given by the employee, together with medical evidence, can you directly connect this injury or occupational disease as job incurred?    If No, is the employee capable of: Full Duty    Modified Duty      Is additional medical care by a physician indicated?    If Modified Duty, Specify any Limitations / Restrictions      Do you know of any previous injury or disease contributing to this condition or occupational disease?      Date  11/2/2019 Print Doctor’s Name  Shahzad Leal I certify the employer’s copy of this form was mailed on:   Address  [address] [place] [address] [id number] [phone] Insurer’s Use Only   Provider’s Tax ID Number  [id number] Telephone  Dept:  "[phone]    Doctor’s Signature    Degree             BRIEF DESCRIPTION OF RIGHTS AND BENEFITS  (Pursuant to [place]RS 616C.050)    Notice of Injury or Occupational Disease (Incident Report Form C-1): If an injury or occupational disease (OD) arises out of and in the course of employment, you must provide written notice to your employer as soon as practicable, but no later than 7 days after the accident or OD. Your employer shall maintain a sufficient supply of the required forms.    Claim for Compensation (Form C-4): If medical treatment is sought, the form C-4 is available at the place of initial treatment. A completed \"Claim for Compensation\" (Form C-4) must be filed within 90 days after an accident or OD. The treating physician or chiropractor must, within 3 working days after treatment, complete and mail to the employer, the employer's insurer and third-party , the Claim for Compensation.    Medical Treatment: If you require medical treatment for your on-the-job injury or OD, you may be required to select a physician or chiropractor from a list provided by your workers’ compensation insurer, if it has contracted with an Organization for Managed Care (MCO) or Preferred Provider Organization (PPO) or providers of health care. If your employer has not entered into a contract with an MCO or PPO, you may select a physician or chiropractor from the Panel of Physicians and Chiropractors. Any medical costs related to your industrial injury or OD will be paid by your insurer.    Temporary Total Disability (TTD): If your doctor has certified that you are unable to work for a period of at least 5 consecutive days, or 5 cumulative days in a 20-day period, or places restrictions on you that your employer does not accommodate, you may be entitled to TTD compensation.    Temporary Partial Disability (TPD): If the wage you receive upon reemployment is less than the compensation for TTD to which you are entitled, the " insurer may be required to pay you TPD compensation to make up the difference. TPD can only be paid for a maximum of 24 months.    Permanent Partial Disability (PPD): When your medical condition is stable and there is an indication of a PPD as a result of your injury or OD, within 30 days, your insurer must arrange for an evaluation by a rating physician or chiropractor to determine the degree of your PPD. The amount of your PPD award depends on the date of injury, the results of the PPD evaluation and your age and wage.    Permanent Total Disability (PTD): If you are medically certified by a treating physician or chiropractor as permanently and totally disabled and have been granted a PTD status by your insurer, you are entitled to receive monthly benefits not to exceed 66 2/3% of your average monthly wage. The amount of your PTD payments is subject to reduction if you previously received a PPD award.    Vocational Rehabilitation Services: You may be eligible for vocational rehabilitation services if you are unable to return to the job due to a permanent physical impairment or permanent restrictions as a result of your injury or occupational disease.    Transportation and Per Tc Reimbursement: You may be eligible for travel expenses and per tc associated with medical treatment.    Reopening: You may be able to reopen your claim if your condition worsens after claim closure.     Appeal Process: If you disagree with a written determination issued by the insurer or the insurer does not respond to your request, you may appeal to the Department of Administration, , by following the instructions contained in your determination letter. You must appeal the determination within 70 days from the date of the determination letter at [address], [address], [place], [place] [address], or [address], [address], [place] [address]. If you disagree with the  decision, you  may appeal to the Department of Administration, . You must file your appeal within 30 days from the date of the  decision letter at [address], [address], [place], [place] [address], or [address], [facility] [facility] [address], [place], [place] [address]. If you disagree with a decision of an , you may file a petition for judicial review with the District Court. You must do so within 30 days of the Appeal Officer’s decision. You may be represented by an  at your own expense or you may contact the [facility] for possible representation.    [place]  for Injured Workers (NAIW): If you disagree with a  decision, you may request that NAIW represent you without charge at an  Hearing. For information regarding denial of benefits, you may contact the [facility] at: [address], [address], [place] [address], [phone], or [address], [address], [place] [address], [phone]    To File a Complaint with the Division: If you wish to file a complaint with the  of the Division of Industrial Relations (DIR),  please contact the Workers’ Compensation Section, [address], [address], [place], [place] [address], telephone [phone]) [phone], or [address], [address], [place], [place] [address], telephone [phone].    For assistance with Workers’ Compensation Issues: You may contact the Office of the Governor Consumer Health Assistance, [address], [address], [place], [place] [address], Toll Free 1-301.181.9301, Web site: http://govcha.[facility].nv., E-mail lucy@govcha.[facility].nv.  D-2 (rev. 06/18)    __________________________________________________________________                                    _________________  Employee Name / Signature                                                                                                                           Date

## 2019-11-01 NOTE — LETTER
FORM C-4:  EMPLOYEE’S CLAIM FOR COMPENSATION/ REPORT OF INITIAL TREATMENT  EMPLOYEE’S CLAIM - PROVIDE ALL INFORMATION REQUESTED   First Name  Dean Last Name  Kayla Birthdate  1985 Age  34 y.o. Sex  male Claim Number   Home Employee Address  [address]  [address] Height  1.829 m (6') Weight  71 kg (156 lb 8.4 oz) N  [id number]   Mailing Employee Address                           [address]  [address] Telephone  [phone] (home)  Primary Language Spoken  ENGLISH   Insurer  *** Third Party   N/A Employee's Occupation (Job Title) When Injury or Occupational Disease Occurred     Employer's Name   Telephone      Employer Address   City   State   Zip     Date of Injury  11/1/2019       Hour of Injury  5:00 PM Date Employer Notified  11/1/2019 Last Day of Work after Injury or Occupational Disease  11/1/2019 Supervisor to Whom Injury Reported  Tiffany Weiss   Address or Location of Accident (if applicable)  [[address] ]   What were you doing at the time of accident? (if applicable)  Cutting ceiling openings   How did this injury or occupational disease occur? Be specific and answer in detail. 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   If you believe that you have an occupational disease, when did you first have knowledge of the disability and it relationship to your employment?  N/A Witnesses to the Accident  Tiffany Weiss     Nature of Injury or Occupational Disease  Workers' Compensation  Part(s) of Body Injured or Affected  Eye (R), N/A, N/A   I certify that the above is true and correct to the best of my knowledge and that I have provided this information in order to obtain the benefits of [place]’s [place] ([place] 616A [place], inclusive or Chapter 617 of [place]).  I hereby authorize any physician, chiropractor, surgeon, practitioner, or other  person, any hospital, including [facility] or [facility] hospital, any medical service organization, any insurance company, or other institution or organization to release to each other, any medical or other information, including benefits paid or payable, pertinent to this injury or disease, except information relative to diagnosis, treatment and/or counseling for AIDS, psychological conditions, alcohol or controlled substances, for which I must give specific authorization.  A Photostat of this authorization shall be as valid as the original.   Date   Place Employee’s Signature         THIS REPORT MUST BE COMPLETED AND MAILED WITHIN 3 WORKING DAYS OF TREATMENT   Place  [facility], EMERGENCY DEPT Name of Facility   [facility]   Date  11/1/2019 Diagnosis  (S05.01XA) Abrasion of right cornea, initial encounter Is there evidence the injured employee was under the influence of alcohol and/or another controlled substance at the time of accident?      Hour  12:20 AM Description of Injury or Disease  Abrasion of right cornea, initial encounter    Treatment    Have you advised the patient to remain off work five days or more?      X-Ray Findings    If Yes From Date    To Date      From information given by the employee, together with medical evidence, can you directly connect this injury or occupational disease as job incurred?    If No, is the employee capable of: Full Duty    Modified Duty      Is additional medical care by a physician indicated?    If Modified Duty, Specify any Limitations / Restrictions      Do you know of any previous injury or disease contributing to this condition or occupational disease?      Date  11/2/2019 Print Doctor’s Name  Shahzad Leal I certify the employer’s copy of this form was mailed on:   Address  [address] [place] [address] [id number] [phone] Insurer’s Use Only   Provider’s Tax ID Number  [id number] Telephone  Dept:  "[phone]    Doctor’s Signature    Degree             BRIEF DESCRIPTION OF RIGHTS AND BENEFITS  ([place]ursuant to [place]RS 616C.050)    Notice of Injury or Occupational Disease (Incident Report Form C-1): If an injury or occupational disease (OD) arises out of and in the course of employment, you must provide written notice to your employer as soon as practicable, but no later than 7 days after the accident or OD. Your employer shall maintain a sufficient supply of the required forms.    Claim for Compensation (Form C-4): If medical treatment is sought, the form C-4 is available at the place of initial treatment. A completed \"Claim for Compensation\" (Form C-4) must be filed within 90 days after an accident or OD. The treating physician or chiropractor must, within 3 working days after treatment, complete and mail to the employer, the employer's insurer and third-party , the Claim for Compensation.    Medical Treatment: If you require medical treatment for your on-the-job injury or OD, you may be required to select a physician or chiropractor from a list provided by your workers’ compensation insurer, if it has contracted with an Organization for Managed Care (MCO) or Preferred Provider Organization (PPO) or providers of health care. If your employer has not entered into a contract with an MCO or PPO, you may select a physician or chiropractor from the Panel of Physicians and Chiropractors. Any medical costs related to your industrial injury or OD will be paid by your insurer.    Temporary Total Disability (TTD): If your doctor has certified that you are unable to work for a period of at least 5 consecutive days, or 5 cumulative days in a 20-day period, or places restrictions on you that your employer does not accommodate, you may be entitled to TTD compensation.    Temporary Partial Disability (TPD): If the wage you receive upon reemployment is less than the compensation for TTD to which you are entitled, the " insurer may be required to pay you TPD compensation to make up the difference. TPD can only be paid for a maximum of 24 months.    Permanent Partial Disability (PPD): When your medical condition is stable and there is an indication of a PPD as a result of your injury or OD, within 30 days, your insurer must arrange for an evaluation by a rating physician or chiropractor to determine the degree of your PPD. The amount of your PPD award depends on the date of injury, the results of the PPD evaluation and your age and wage.    Permanent Total Disability (PTD): If you are medically certified by a treating physician or chiropractor as permanently and totally disabled and have been granted a PTD status by your insurer, you are entitled to receive monthly benefits not to exceed 66 2/3% of your average monthly wage. The amount of your PTD payments is subject to reduction if you previously received a PPD award.    Vocational Rehabilitation Services: You may be eligible for vocational rehabilitation services if you are unable to return to the job due to a permanent physical impairment or permanent restrictions as a result of your injury or occupational disease.    Transportation and Per Tc Reimbursement: You may be eligible for travel expenses and per tc associated with medical treatment.    Reopening: You may be able to reopen your claim if your condition worsens after claim closure.     Appeal Process: If you disagree with a written determination issued by the insurer or the insurer does not respond to your request, you may appeal to the Department of Administration, , by following the instructions contained in your determination letter. You must appeal the determination within 70 days from the date of the determination letter at [address], [address], [place], [place] [address], or [address], [address], [place] [address]. If you disagree with the  decision, you  may appeal to the Department of Administration, . You must file your appeal within 30 days from the date of the  decision letter at [address], [address], [place], [place] [address], or [address] [facility] [address], [place], [place] [address]. If you disagree with a decision of an , you may file a petition for judicial review with the District Court. You must do so within 30 days of the Appeal Officer’s decision. You may be represented by an  at your own expense or you may contact the [facility] for possible representation.    [place]  for In[place] Workers (NAIW): If you disagree with a  decision, you may request that NAIW represent you without charge at an  Hearing. For information regarding denial of benefits, you may contact the [facility] at: [address], [address], [place] [address], [phone], or [address], [address], [place] [address], [phone]    To File a Complaint with the Division: If you wish to file a complaint with the  of the Division of Industrial Relations (DIR),  please contact the Workers’ Compensation Section, [address], [address], [place], [place] [address], telephone [phone]) [phone], or [address], [address], [place], [place] [address], telephone [phone].    For assistance with Workers’ Compensation Issues: You may contact the Office of the Governor Consumer Health Assistance, [address], [address], [place], [place] [address], Toll Free 1-483.616.5499, Web site: http://govcha.[facility].nv., E-mail lucy@govcha.[facility].[email or web address].  D-2 (rev. 06/18)    __________________________________________________________________                                    _________________  Employee Name / Signature                                                                                                                           Date

## 2019-11-02 PROCEDURE — 700101 HCHG RX REV CODE 250: Performed by: EMERGENCY MEDICINE

## 2019-11-02 RX ADMIN — FLUORESCEIN SODIUM 1 MG: 1 STRIP OPHTHALMIC at 00:00

## 2019-11-02 RX ADMIN — PROPARACAINE HYDROCHLORIDE 1 DROP: 5 SOLUTION/ DROPS OPHTHALMIC at 00:00

## 2019-11-02 NOTE — ED TRIAGE NOTES
"Dean Oquendotson  34 y.o. 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        Pt amb to triage with steady gait for above complaint.   Pt is alert and oriented, speaking in full sentences, follows commands and responds appropriately to questions. Resp are even and unlabored. No behavioral indicators of pain.   Pt placed in lobby. Pt educated on triage process. Pt encouraged to alert staff for any changes.    "

## 2019-11-02 NOTE — ED PROVIDER NOTES
"ER Provider Note     Scribed for Shahzad Leal M.D. by Roman Miller. 11/1/2019, 11:36 PM.    Primary Care Provider: No primary care provider on file.  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        HPI  Dean Garza is a 34 y.o. male who presents to the Emergency Department after getting a foreign body in his right eye. 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 Per nurses note he endorses associated photophobia but denies changes in vision. He had metal removed from his left eye several days ago.     REVIEW OF SYSTEMS  See HPI for further details.      PAST MEDICAL HISTORY       SURGICAL HISTORY  patient denies any surgical history    SOCIAL HISTORY  Social History     Tobacco Use   • Smoking status: Current Every Day Smoker     Packs/day: 1.00     Types: Cigarettes   • Smokeless tobacco: Never Used   Substance Use Topics   • Alcohol use: Yes     Frequency: Never     Comment: \"couple beers\"   • Drug use: Yes     Comment: marijuana      Social History     Substance and Sexual Activity   Drug Use Yes    Comment: marijuana       FAMILY HISTORY  History reviewed. No pertinent family history.    CURRENT MEDICATIONS    Current Facility-Administered Medications:   •  [COMPLETED] fluorescein ophthalmic strip 1 mg, 1 Strip, Both Eyes, Once, Shahzad Leal M.D., 1 mg at 11/02/19 0000  •  [COMPLETED] proparacaine (OPTHAINE) 0.5 % ophthalmic solution 1 Drop, 1 Drop, Right Eye, Once, Shahzad Leal M.D., 1 Drop at 11/02/19 0000  No current outpatient medications noted.    ALLERGIES  No Known Allergies    PHYSICAL EXAM  VITAL SIGNS: /88   " Pulse 75   Temp 36.4 °C (97.5 °F) (Temporal)   Resp 16   Ht 1.829 m (6')   Wt 71 kg (156 lb 8.4 oz)   SpO2 98%   BMI 21.23 kg/m²    Constitutional: Alert in no apparent distress.  HENT: Normocephalic, Atraumatic, Bilateral external ears normal. Nose normal.   Eyes: Abrasion of right cornea. Pupils are equal and reactive. Conjunctiva normal, non-icteric.   Heart: Regular rate and rythm, no murmurs.    Lungs: Clear to auscultation bilaterally.  Skin: Warm, Dry, No erythema, No rash.   Neurologic: Alert, Grossly non-focal.   Psychiatric: Affect normal, Judgment normal, Mood normal, Appears appropriate and not intoxicated.     COURSE & MEDICAL DECISION MAKING  Pertinent Labs & Imaging studies reviewed. (See chart for details)    This is a 34 y.o. male that presents with a small corneal abrasion.  He has no foreign object in his eye.  He is already received topical antibiotics.      11:36 PM - Patient seen and examined at bedside. Ordered fluorescein opthalmaic strip and Opthaine. After further inspection of his eye, there was no foreign body found but there is an abrasion. He already has antibiotic drops for his left eye so I explained the need to use those on his right eye as well.  The patient will return for new or worsening symptoms and is stable at the time of discharge.  The patient is referred to a primary physician for blood pressure management, diabetic screening, and for all other preventative health concerns.    DISPOSITION:  Patient will be discharged home in stable condition.    FOLLOW UP:  [address] [address]  [phone]  Go in 2 days      OUTPATIENT MEDICATIONS:  New Prescriptions    No medications on file       FINAL IMPRESSION  1. Abrasion of right cornea, initial encounter          Roman ELMORE (Mirella), am scribing for, and in the presence of, Shahzad Leal M.D..    Electronically signed by: Roman Miller (Mirella), 11/1/2019    Shahzad ELMORE  NORBERTO Leal. personally performed the services described in this documentation, as scribed by Roman Miller in my presence, and it is both accurate and complete. E    The note accurately reflects work and decisions made by me.  Shahzad Leal  11/2/2019  5:30 AM

## 2019-11-02 NOTE — ED NOTES
Pt ambulatory with steady gait to YL 67, pt sitting on gurney, call light in reach, chart up for ERP.

## 2019-11-02 NOTE — ED NOTES
Pt discharged home via ambulatory to lobby with steady gait, AOx4. Pt in possession of belongings. Pt provided discharge education and information pertaining to medications and follow up appointments. Pt received copy of discharge instructions and verbalized understanding.     /82   Pulse 60   Temp 36.8 °C (98.2 °F) (Temporal)   Resp 17   Ht 1.829 m (6')   Wt 71 kg (156 lb 8.4 oz)   SpO2 95%   BMI 21.23 kg/m²

## 2020-09-08 ENCOUNTER — HOSPITAL ENCOUNTER (EMERGENCY)
Facility: HOSPITAL | Age: 35
Discharge: HOME | End: 2020-09-09
Attending: EMERGENCY MEDICINE | Admitting: EMERGENCY MEDICINE

## 2020-09-08 DIAGNOSIS — T40.1X1A ACCIDENTAL OVERDOSE OF HEROIN, INITIAL ENCOUNTER (HCC): Primary | ICD-10-CM

## 2020-09-08 PROCEDURE — 99284 EMERGENCY DEPT VISIT MOD MDM: CPT

## 2020-09-08 PROCEDURE — 25010000002 NALOXONE PER 1 MG: Performed by: EMERGENCY MEDICINE

## 2020-09-08 PROCEDURE — 96374 THER/PROPH/DIAG INJ IV PUSH: CPT

## 2020-09-08 RX ORDER — NALOXONE HCL 0.4 MG/ML
0.4 VIAL (ML) INJECTION ONCE
Status: COMPLETED | OUTPATIENT
Start: 2020-09-08 | End: 2020-09-08

## 2020-09-08 RX ORDER — ESCITALOPRAM OXALATE 20 MG/1
20 TABLET ORAL DAILY
COMMUNITY

## 2020-09-08 RX ADMIN — NALOXONE HYDROCHLORIDE 0.4 MG: 0.4 INJECTION, SOLUTION INTRAMUSCULAR; INTRAVENOUS; SUBCUTANEOUS at 23:27

## 2020-09-09 VITALS
RESPIRATION RATE: 16 BRPM | DIASTOLIC BLOOD PRESSURE: 81 MMHG | HEART RATE: 75 BPM | TEMPERATURE: 99.2 F | HEIGHT: 72 IN | WEIGHT: 165 LBS | SYSTOLIC BLOOD PRESSURE: 126 MMHG | OXYGEN SATURATION: 97 % | BODY MASS INDEX: 22.35 KG/M2

## 2020-09-09 NOTE — ED PROVIDER NOTES
EMERGENCY DEPARTMENT ENCOUNTER      Pt Name: Britton Jamison  MRN: [id number]  YOB: 1985  Date of evaluation: 9/8/2020  Provider: Boris Dupont DO    CHIEF COMPLAINT       Chief Complaint   Patient presents with   • Drug Overdose         HISTORY OF PRESENT ILLNESS  (Location/Symptom, Timing/Onset, Context/Setting, Quality, Duration, Modifying Factors, Severity.)   Britton Jamison is a 35 y.o. male who presents to the emergency department via EMS for evaluation status post an unintentional overdose likely of an opioid substance including heroin.  The patient states he came in from work, snorted some substance and was not sure what it was with 1 of his roommates and was told that he stops breathing, states police and EMS arrived to his house were given him some medication which reversed the opioid.  He states he has some nausea sensation currently states feeling mildly anxious.  Denies any history of IV drug abuse, admits to intermittent use polysubstance, denies any prior use of heroin.  No chest pain or difficulty breathing.  Patient is no other acute systemic complaints this time.  Does take medication as antidepressant only.      Nursing notes were reviewed.    REVIEW OF SYSTEMS    (2-9 systems for level 4, 10 or more for level 5)   ROS:  General:  No fevers, no chills, no weakness  Cardiovascular:  No chest pain, no palpitations  Respiratory:  No shortness of breath, no cough, no wheezing  Gastrointestinal:  No pain, + nausea, no vomiting, no diarrhea  Musculoskeletal:  No muscle pain, no joint pain  Skin:  No rash, no easy bruising  Neurologic:  No speech problems, no headache, no extremity numbness, no extremity tingling, no extremity weakness  Psychiatric:  No anxiety  Genitourinary:  No dysuria, no hematuria    Except as noted above the remainder of the review of systems was reviewed and negative.       PAST MEDICAL HISTORY     Past Medical History:   Diagnosis Date   • Depression   "        SURGICAL HISTORY     History reviewed. No pertinent surgical history.      CURRENT MEDICATIONS     No current facility-administered medications for this encounter.     Current Outpatient Medications:   •  escitalopram (LEXAPRO) 20 MG tablet, Take 20 mg by mouth Daily., Disp: , Rfl:     ALLERGIES     Patient has no known allergies.    FAMILY HISTORY     History reviewed. No pertinent family history.       SOCIAL HISTORY       Social History     Socioeconomic History   • Marital status: Single     Spouse name: Not on file   • Number of children: Not on file   • Years of education: Not on file   • Highest education level: Not on file   Tobacco Use   • Smoking status: Current Every Day Smoker     Packs/day: 1.00     Types: Cigarettes   Substance and Sexual Activity   • Drug use: Yes     Types: Cocaine         PHYSICAL EXAM    (up to 7 for level 4, 8 or more for level 5)     Vitals:    09/08/20 2104 09/08/20 2105   BP:  138/90   Pulse:  94   Resp:  20   Temp:  99.2 °F (37.3 °C)   TempSrc:  Oral   SpO2:  98%   Weight: 74.8 kg (165 lb)    Height: 182.9 cm (72\")        Physical Exam  General : Patient is awake, alert, oriented, in no acute distress, nontoxic appearing, alert and answering questions appropriately.  HEENT: Pupils are equally round and reactive to light, EOMI, conjunctivae clear, sclerae white, there is no injection no icterus.  Oral mucosa is moist, no exudate. Uvula is midline  Neck: Neck is supple, full range of motion, trachea midline  Cardiac: Heart regular rate, rhythm, no murmurs, rubs, or gallops  Lungs: Lungs are clear to auscultation, there is no wheezing, rhonchi, or rales. There is no use of accessory muscles  Chest wall: There is very mild tenderness in the anterior chest wall, no flail chest appreciated, no contusions, breath sounds are clear and equal bilaterally.  Equal chest rise.  Abdomen: Abdomen is soft, nontender, nondistended. There is no firm or pulsatile masses, no rebound " "rigidity or guarding.   Musculoskeletal: 5 out of 5 strength in all 4 extremities.  No focal muscle deficits are appreciated  Neuro: Motor intact, sensory intact, level of consciousness is normal, GCS 15  Dermatology: Skin is warm and dry  Psych: Mentation is grossly normal, cognition is grossly normal. Affect is appropriate.      DIAGNOSTIC RESULTS     EKG: All EKG's are interpreted by the Emergency Department Physician who either signs or Co-signs this chart in the absence of a cardiologist.    No orders to display       RADIOLOGY:   Non-plain film images such as CT, Ultrasound and MRI are read by the radiologist. Plain radiographic images are visualized and preliminarily interpreted by the emergency physician with the below findings:      [] Radiologist's Report Reviewed:  No orders to display         ED BEDSIDE ULTRASOUND:   Performed by ED Physician - none    LABS:    I have reviewed and interpreted all of the currently available lab results from this visit (if applicable):  No results found for this or any previous visit.     All other labs were within normal range or not returned as of this dictation.      EMERGENCY DEPARTMENT COURSE and DIFFERENTIAL DIAGNOSIS/MDM:   Vitals:    Vitals:    09/08/20 2104 09/08/20 2105   BP:  138/90   Pulse:  94   Resp:  20   Temp:  99.2 °F (37.3 °C)   TempSrc:  Oral   SpO2:  98%   Weight: 74.8 kg (165 lb)    Height: 182.9 cm (72\")             Pain status post unintentional heroin overdose as he snorted a substance prior to becoming unresponsive, bystander CPR was initiated per the patient.  Currently has no acute complaints, vital signs are stable upon arrival to the ED, breath sounds are clear and equal bilaterally.  Patient was placed on cardiac monitor.  Had a discussion on the dangers given given his young age on substance abuse and heroin use including morbidity, mortality associated with this.  Patient is awake alert, GCS 15.  Patient monitored in the ED. we did give him " a dose of 0.4 mg Narcan IV.  He was monitored for 4 hours in the emergency department, and reevaluation the patient is a resting comfortably, no hypoxia or respiratory depression.  We discussed the dangers obviously of substance abuse.  Patient does have an awareness of this will give him a list of rehab facilities with a plan to follow-up with his PCP in a couple days for reevaluation, he does have a friend with him who will continue to monitor him this evening. Emphasis was placed on timely follow-up after discharge.  I also discussed the potential for the development of an acute emergent condition requiring further evaluation, admission, or even surgical intervention. I discussed that we found nothing during the visit today indicating the need for further workup, admission, or the presence of an unstable medical condition.  I encouraged the patient to return to the emergency department immediately for ANY concerns, worsening, new complaints, or if symptoms persist and unable to seek follow-up in a timely fashion.  The patient/family expressed understanding and agreement with this plan.  The patient will follow-up with their PCP in 1-2 days for reevaluation.       MEDICATIONS ADMINISTERED IN ED:  Medications   naloxone (NARCAN) injection 0.4 mg (0.4 mg Intravenous Given 9/8/20 5114)       PROCEDURES:  Procedures    CRITICAL CARE TIME    Total Critical Care time was 0 minutes, excluding separately reportable procedures.   There was a high probability of clinically significant/life threatening deterioration in the patient's condition which required my urgent intervention.      FINAL IMPRESSION      1. Accidental overdose of heroin, initial encounter (CMS/[facility])          DISPOSITION/PLAN     ED Disposition     ED Disposition Condition Comment    Discharge Stable           PATIENT REFERRED TO:  Your PCP or one on the list    In 2 days      [facility] Emergency Department  [address]  [place] [address]  [phone]    If symptoms worsen      DISCHARGE MEDICATIONS:     Medication List      CONTINUE taking these medications    escitalopram 20 MG tablet  Commonly known as:  LEXAPRO              Comment: Please note this report has been produced using speech recognition software.      Boris Dupont DO  Attending Emergency Physician               Boris Dupont DO  09/09/20 0102

## 2021-01-01 ENCOUNTER — HOSPITAL ENCOUNTER (EMERGENCY)
Facility: HOSPITAL | Age: 36
Discharge: HOME | End: 2021-01-01
Attending: EMERGENCY MEDICINE | Admitting: EMERGENCY MEDICINE

## 2021-01-01 ENCOUNTER — APPOINTMENT (OUTPATIENT)
Dept: GENERAL RADIOLOGY | Facility: HOSPITAL | Age: 36
End: 2021-01-01

## 2021-01-01 VITALS
TEMPERATURE: 98 F | SYSTOLIC BLOOD PRESSURE: 116 MMHG | DIASTOLIC BLOOD PRESSURE: 82 MMHG | BODY MASS INDEX: 20.99 KG/M2 | WEIGHT: 155 LBS | RESPIRATION RATE: 18 BRPM | OXYGEN SATURATION: 96 % | HEART RATE: 82 BPM | HEIGHT: 72 IN

## 2021-01-01 DIAGNOSIS — T50.901A ACCIDENTAL DRUG OVERDOSE, INITIAL ENCOUNTER: Primary | ICD-10-CM

## 2021-01-01 DIAGNOSIS — F11.10 HEROIN ABUSE (HCC): ICD-10-CM

## 2021-01-01 LAB
ALBUMIN SERPL-MCNC: 4.3 G/DL (ref 3.5–5.2)
ALBUMIN/GLOB SERPL: 1.6 G/DL
ALP SERPL-CCNC: 72 U/L (ref 39–117)
ALT SERPL W P-5'-P-CCNC: 19 U/L (ref 1–41)
ANION GAP SERPL CALCULATED.3IONS-SCNC: 10 MMOL/L (ref 5–15)
AST SERPL-CCNC: 19 U/L (ref 1–40)
BASOPHILS # BLD AUTO: 0.03 10*3/MM3 (ref 0–0.2)
BASOPHILS NFR BLD AUTO: 0.2 % (ref 0–1.5)
BILIRUB SERPL-MCNC: 0.6 MG/DL (ref 0–1.2)
BUN SERPL-MCNC: 17 MG/DL (ref 6–20)
BUN/CREAT SERPL: 18.3 (ref 7–25)
CALCIUM SPEC-SCNC: 9.2 MG/DL (ref 8.6–10.5)
CHLORIDE SERPL-SCNC: 105 MMOL/L (ref 98–107)
CO2 SERPL-SCNC: 28 MMOL/L (ref 22–29)
CREAT SERPL-MCNC: 0.93 MG/DL (ref 0.76–1.27)
DEPRECATED RDW RBC AUTO: 42.4 FL (ref 37–54)
EOSINOPHIL # BLD AUTO: 0.06 10*3/MM3 (ref 0–0.4)
EOSINOPHIL NFR BLD AUTO: 0.5 % (ref 0.3–6.2)
ERYTHROCYTE [DISTWIDTH] IN BLOOD BY AUTOMATED COUNT: 11.9 % (ref 12.3–15.4)
ETHANOL BLD-MCNC: <10 MG/DL (ref 0–10)
GFR SERPL CREATININE-BSD FRML MDRD: 92 ML/MIN/1.73
GLOBULIN UR ELPH-MCNC: 2.7 GM/DL
GLUCOSE SERPL-MCNC: 96 MG/DL (ref 65–99)
HCT VFR BLD AUTO: 45.6 % (ref 37.5–51)
HGB BLD-MCNC: 15.1 G/DL (ref 13–17.7)
IMM GRANULOCYTES # BLD AUTO: 0.04 10*3/MM3 (ref 0–0.05)
IMM GRANULOCYTES NFR BLD AUTO: 0.3 % (ref 0–0.5)
LYMPHOCYTES # BLD AUTO: 1.43 10*3/MM3 (ref 0.7–3.1)
LYMPHOCYTES NFR BLD AUTO: 10.9 % (ref 19.6–45.3)
MCH RBC QN AUTO: 31.9 PG (ref 26.6–33)
MCHC RBC AUTO-ENTMCNC: 33.1 G/DL (ref 31.5–35.7)
MCV RBC AUTO: 96.4 FL (ref 79–97)
MONOCYTES # BLD AUTO: 1.02 10*3/MM3 (ref 0.1–0.9)
MONOCYTES NFR BLD AUTO: 7.8 % (ref 5–12)
NEUTROPHILS NFR BLD AUTO: 10.57 10*3/MM3 (ref 1.7–7)
NEUTROPHILS NFR BLD AUTO: 80.3 % (ref 42.7–76)
NRBC BLD AUTO-RTO: 0 /100 WBC (ref 0–0.2)
NT-PROBNP SERPL-MCNC: 5.9 PG/ML (ref 0–450)
PLATELET # BLD AUTO: 257 10*3/MM3 (ref 140–450)
PMV BLD AUTO: 9 FL (ref 6–12)
POTASSIUM SERPL-SCNC: 4 MMOL/L (ref 3.5–5.2)
PROT SERPL-MCNC: 7 G/DL (ref 6–8.5)
QT INTERVAL: 426 MS
QTC INTERVAL: 469 MS
RBC # BLD AUTO: 4.73 10*6/MM3 (ref 4.14–5.8)
SODIUM SERPL-SCNC: 143 MMOL/L (ref 136–145)
TROPONIN T SERPL-MCNC: <0.01 NG/ML (ref 0–0.03)
WBC # BLD AUTO: 13.15 10*3/MM3 (ref 3.4–10.8)

## 2021-01-01 PROCEDURE — 99283 EMERGENCY DEPT VISIT LOW MDM: CPT

## 2021-01-01 PROCEDURE — 80053 COMPREHEN METABOLIC PANEL: CPT | Performed by: NURSE PRACTITIONER

## 2021-01-01 PROCEDURE — 82077 ASSAY SPEC XCP UR&BREATH IA: CPT | Performed by: NURSE PRACTITIONER

## 2021-01-01 PROCEDURE — 93005 ELECTROCARDIOGRAM TRACING: CPT | Performed by: NURSE PRACTITIONER

## 2021-01-01 PROCEDURE — 84484 ASSAY OF TROPONIN QUANT: CPT | Performed by: NURSE PRACTITIONER

## 2021-01-01 PROCEDURE — 83880 ASSAY OF NATRIURETIC PEPTIDE: CPT | Performed by: NURSE PRACTITIONER

## 2021-01-01 PROCEDURE — 71045 X-RAY EXAM CHEST 1 VIEW: CPT

## 2021-01-01 PROCEDURE — 85025 COMPLETE CBC W/AUTO DIFF WBC: CPT | Performed by: NURSE PRACTITIONER

## 2021-01-01 RX ORDER — SODIUM CHLORIDE 0.9 % (FLUSH) 0.9 %
10 SYRINGE (ML) INJECTION AS NEEDED
Status: DISCONTINUED | OUTPATIENT
Start: 2021-01-01 | End: 2021-01-01 | Stop reason: HOSPADM

## 2021-01-01 RX ADMIN — SODIUM CHLORIDE 1000 ML: 9 INJECTION, SOLUTION INTRAVENOUS at 18:27

## 2021-01-02 NOTE — ED PROVIDER NOTES
Subjective   Patient presents to the ER for an unintentional heroin overdose after snorting heroin.  Reported that his friends did CPR on him and they also gave him 8 of Narcan EMS also gave him for Narcan.  The patient has recovered he is reporting he has pain to his chest that is worse with movement.  He denies any IV drug use.  Tells me he has overdosed on heroin a couple times but he typically does not do it very often.      Drug Overdose  Severity:  Unable to specify  Onset quality:  Unable to specify  Timing:  Unable to specify  Progression:  Resolved  Chronicity:  New  Relieved by:  Narcan  Worsened by:  Heroin  Associated symptoms: chest pain    Associated symptoms: no abdominal pain, no congestion, no cough, no diarrhea, no fever, no nausea, no shortness of breath, no sore throat, no vomiting and no wheezing        Review of Systems   Constitutional: Negative for chills, diaphoresis and fever.   HENT: Negative for congestion and sore throat.    Respiratory: Negative for cough, choking, chest tightness, shortness of breath and wheezing.    Cardiovascular: Positive for chest pain. Negative for leg swelling.   Gastrointestinal: Negative for abdominal distention, abdominal pain, anal bleeding, blood in stool, constipation, diarrhea, nausea and vomiting.   Genitourinary: Negative for difficulty urinating, dysuria, flank pain, frequency, hematuria and urgency.   All other systems reviewed and are negative.      Past Medical History:   Diagnosis Date   • Depression        No Known Allergies    History reviewed. No pertinent surgical history.    History reviewed. No pertinent family history.    Social History     Socioeconomic History   • Marital status: Single     Spouse name: Not on file   • Number of children: Not on file   • Years of education: Not on file   • Highest education level: Not on file   Tobacco Use   • Smoking status: Current Every Day Smoker     Packs/day: 1.00     Types: Cigarettes   Substance and  Sexual Activity   • Alcohol Use     Frequency: Never     Comment: socially   • Drug use: Yes     Types: Cocaine(coke), Methamphetamines     Comment: heroin           Objective   Physical Exam  Constitutional:       Appearance: He is well-developed.   HENT:      Head: Normocephalic and atraumatic.      Right Ear: External ear normal.      Left Ear: External ear normal.      Nose: Nose normal.   Eyes:      Conjunctiva/sclera: Conjunctivae normal.      Pupils: Pupils are equal, round, and reactive to light.   Neck:      Musculoskeletal: Normal range of motion and neck supple.   Cardiovascular:      Rate and Rhythm: Normal rate and regular rhythm.      Heart sounds: Normal heart sounds.   Pulmonary:      Effort: Pulmonary effort is normal.      Breath sounds: Normal breath sounds.   Chest:          Comments: Anterior chest walls easily reproduced the pain with palpation  Abdominal:      General: Bowel sounds are normal.      Palpations: Abdomen is soft.   Musculoskeletal: Normal range of motion.   Skin:     General: Skin is warm and dry.   Neurological:      Mental Status: He is alert and oriented to person, place, and time.   Psychiatric:         Behavior: Behavior normal.         Judgment: Judgment normal.         Procedures           ED Course  ED Course as of Jan 01 2031 Fri Jan 01, 2021 1940 Personally reviewed the chest x-ray as well as the read from radiology.  No focal infiltrate.  No emergent findings.  No pneumothorax.  See report for details.   XR Chest 1 View [RS]   2027 Case discussed with Dr. Gleason.  Patient has been resting company for the duration of ER stay.  He is ready to go home.  Is been in the ER for most 4 hours.  He tells me has been to this before and is ready to go home.    [JM]      ED Course User Index  [JM] Cosme Helm APRN  [RS] Britton Gleason MD           XR Chest 1 View   Final Result   No acute cardiopulmonary process.       DICTATED:   01/01/2021   EDITED/ls :    01/01/2021        This report was finalized on 1/1/2021 6:43 PM by Dr. Yuan Quarles.                                            [facility]    Final diagnoses:   Accidental drug overdose, initial encounter   Heroin abuse (CMS/[facility])            Cosme Helm APRN  01/01/21 2032

## 2021-12-13 ENCOUNTER — TELEPHONE (OUTPATIENT)
Dept: FAMILY MEDICINE CLINIC | Facility: CLINIC | Age: 36
End: 2021-12-13

## 2022-03-17 NOTE — ED NOTES
ERP at bedside.    Rodger Zepeda [id number] : 1971  Patient's Email: [email or web address]   Results reviewed as of 2018  Quit smoking   Patient Care Team:  Ranjit Diane MD as PCP - General (Family Practice)  Salomón Cardozo MD as Cardiologist (Cardiology)   Dr. Valentine Bernal-Laura- EP    10/21/2015 First post op visit, Discharge 2015 Summary ECHO today r/t AVR, CBC,CMP, CXR and ECG.    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 He has no suicidal ideation and is safe to be discharged. I am going to call colleagues at [facility] behavioral health today to see if I can get him a referral for care.     3/30/2017:  The patient states that he is feeling better. He has made positive changes in his life. New position at work, completed his move, meeting with a psychologist, decreased his ETOH intake. He denies symptoms of chest pain/tightness, palpitations shortness of breath, WORKMAN, dizziness, light headedness, syncope/presyncope, orthopnea, or fluid retention. BP at goal. No NEW problems ; Many thanks to Dr Cartagena for depression screening and SAB counseling.  We are ready to send [facility].     2018 Patient presents to clinic for annual f/u.  Pt has been doing better since switching jobs. 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 He feels much better, he's in [facility]. His mom's breast cancer recurred after being in remission for 2 years. Se is on chemotherapy at a [facility] [place].   No chest pain or shortness of breath. After walking long distances his legs hurts. Summary  Seen in office today with Dr. Smith, we examined the patient and performed the above assessment and plan together, we have amended it above as necessary.   See R  column I discussed with him that we will perform echo in 18 months given my normal exam today. Note he rec'd bioprosthetic valve (?) Drinking less, less stress with new job. BP cpntrolled and on correct meds.    6/12/2019  He presents to clinic for 10 month follow up.  He is reporting mobility issues stating \"he feels as if he is walking from his hips\" and stiffness.  He reports leg fatigue within 200 ft and improves with 30 seconds of rest.  He reports bilateral edema that increases throughout the day.  He has changed jobs to a less stressful environment since last clinic visit.    Angina or equivalents such as neck/jaw/shoulder/arm pain:  Denies   Exertional symptoms: Denies  Other symptoms suggestive of coronary ischemia: denies  Shortness of breath, dyspnea on exertion, or paroxysmal nocturnal dyspnea: denies  Dysrhtymia symptoms: denies  PVD symptoms: leg fatigue with in 200 ft and recovers with 30 seconds of rest.  Neurologic Sx: TIAs, episodic weakness/ sensory change, transient visual changes: Denies   Activities that provoke symptoms or difficulties that did NOT  __6_ months ago: increase in leg fatigue when walking.   Stairs:  Can ascend 1+  flight(s)    Housework/laundry:   Needs to take a break while vacuuming. Shopping can only shop 1/2 the store due to leg fatigue.  Yard work/equivalent: n/a   Headache, fever, chills, faintness or dizziness: once every couple weeks  l.  Risk factors:  1st degree w/ CAD < age 60 No  HTN yes  HLD No  Diabetes diagnosed No   Smoking /quit year 2015  Male yes  Sedentary YES  Obesity No  6/12/2019   Summary (Alyse Cardozo):   *AVR and TAdissection repair now 4 y ago. Unfortunately, Thor Ao was not examined. BP LDL at goal. I will try to add on echo today Taking Abx but he needs dental cleaning! Discussed.    10/15/2021 Neurology   IMPRESSION/PLAN:   1. 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  Prescribed lorazepam for claustrophobia.  Optho exam with Dr Clark  2.  Vitamin B12 deficiency: taking oral B12, give one IM dose while he is here, check B12 level  3.  Folate deficiency: taking supplement, check level today too  4.  Vitamin D deficiency:  taking supplement, check level in 3 months  RTC with me in 6 weeks    12/9/2021 Office visit Infectious disease Dr. Be   1. 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  - normal HSV IgM antibodies  - CSF IgG levels elevated due to circulating serum antibodies from previous infection. No evidence of active infection, with normal IgM antibodies  2. Presumed multiple sclerosis, with multilevel lesions of cervical and thoracic cord, as well as roseanne and medulla, noted on MR imaging  - following with neurology  - considering therapy with Ocrevus  3. H/o pacemaker placement, due to AV block and cardiac arrhythmias  4. H/o thoracic aortic dissection, s/p repair with AVR. 9/2015  5. S/p AVR  Plan  · No treatment needed for HSV, as no evidence of active infection  · Ok from ID standpoint to begin Ocrevus, per Neurology  · Follow up with ID if any new or worsening symptoms        3/17/2022  Interval CV assessment. New or worsening: See below. No angina or equivalents, WORKMAN/SOB, PND, orthopnea, arrythmias, intermittent claudication or PVD symptoms unless noted - ACD      COMPLIANT WITH CURRENT MEDICATION LIST UNLESS OTHERWISE NOTED:     Current Outpatient Medications   Medication Sig   • labetalol (NORMODYNE) 200 MG tablet Take 1 tablet by mouth 2 times daily. Keep appt 03/2022 for further refills   • Xarelto 20 MG Tab TAKE 1 TABLET BY MOUTH DAILY WITH DINNER   • Vitamin D, Ergocalciferol, 1.25 mg (50,000 units) capsule Take 1 capsule by mouth 1  day a week. Indications: Vitamin D Deficiency   • folic acid (FOLATE) 1 MG tablet Take 1 mg by mouth daily.   • Cyanocobalamin (VITAMIN B-12 PO) Take 1,000 mcg by mouth daily. Indications: Inadequate Vitamin B12   • loratadine (CLARITIN) 10 MG tablet Take 10 mg by mouth daily.    • acetaminophen (TYLENOL) 500 MG tablet Take 500 mg by mouth every 6 hours as needed.   • LORazepam (Ativan) 1 MG tablet One tablet 1/2 hour before the MRI, may repeat during the test if needed     No current facility-administered medications for this visit.      Family History   Problem Relation Age of Onset   • High cholesterol Mother    • Hypertension Father    • High cholesterol Father      Social History     Socioeconomic History   • Marital status: Single     Spouse name: Not on file   • Number of children: Not on file   • Years of education: Not on file   • Highest education level: Not on file   Occupational History   • Occupation: secondary education    Tobacco Use   • Smoking status: Former Smoker     Packs/day: 1.00     Types: Cigarettes     Start date:      Quit date:      Years since quittin.2   • Smokeless tobacco: Never Used   Substance and Sexual Activity   • Alcohol use: Yes     Alcohol/week: 14.0 - 21.0 standard drinks     Types: 14 - 21 Standard drinks or equivalent per week     Comment: decreased   • Drug use: Never   • Sexual activity: Not on file   Other Topics Concern   • Not on file   Social History Narrative    Works as a  for two small campuses and fired last Friday was to return 11/9/15     Social Determinants of Health     Financial Resource Strain: Not on file   Food Insecurity: Not on file   Transportation Needs: Not on file   Physical Activity: Not on file   Stress: Not on file   Social Connections: Not on file   Intimate Partner Violence: Not At Risk   • Social Determinants: Intimate Partner Violence Past Fear: No   • Social Determinants: Intimate Partner Violence  Current Fear: No            PHYSICAL EXAMINATION   There were no vitals taken for this visit.      General appearance: No distress, conversant, appropriate  Neck:spontaneous full range of motion, supple,   Skin warm and dry no vascular ulcers   Eyes: Anicteric sclerae; No xanthelasma  JVP flat seated  Chest configuration: Normal   Carotid no bruit at all. Pulses in UE are symetrical and equal.   Lungs: Clear to auscultation   CV: PMI non-dsplaced, RRR, +S4, Normal S1/physiologically split S2, + valve click  Murmurs: Systolic ejection soft, MR none appreciated TR none appreciated   Abdomen: Soft, non-tender; on prior visit no masses or HSM   Extremities: No peripheral edema no digital cyanosis no clubbing  Distal pulses 2+ bilateral radial on prior visit  No acute joint inflammation.  Psych alert mood appropriate  cN II-XII intact. No neuro deficites.    Lab Results   Component Value Date    SODIUM 138 05/04/2021    POTASSIUM 3.8 05/04/2021    CHLORIDE 103 05/04/2021    CO2 25 05/04/2021    ANIONGAP 14 05/04/2021    GLUCOSE 71 05/04/2021    BUN 10 05/04/2021    CREATININE 1.20 (H) 05/04/2021    GFRESTIMATE 71 (L) 05/04/2021    CALCIUM 8.6 05/04/2021    BILIRUBIN 0.8 05/04/2021     (H) 05/04/2021     (H) 05/04/2021    ALKPT 65 05/04/2021    TOTPROTEIN 7.0 05/04/2021    ALBUMIN 3.6 05/04/2021    GLOB 3.4 05/04/2021    AGR 1.1 05/04/2021    WBC 2.7 (L) 05/04/2021    HCT 41.0 05/04/2021    HGB 13.6 05/04/2021     05/04/2021      Lab Results   Component Value Date    INR 1.3 05/04/2021    BNP 56 01/13/2017    HGBA1C 5.4 01/29/2021    TSH 1.075 01/29/2021    NTPROB 142 (H) 05/04/2021    VITD25 8.5 (L) 08/26/2021    CHOLESTEROL 282 (H) 01/29/2021    CALCLDL 117 01/29/2021     01/29/2021    TRIGLYCERIDE 85 01/29/2021    NONHDL 134 01/29/2021    CHOHDL 1.9 01/29/2021        IMAGING    ECHO 9/23/2015: Low normal left ventricular cavity size. Upper normal left ventricular wall thickness. Normal left  ventricular systolic function.  Paradoxical septal motion is present. EF 60-65%. E/a fusion prevents accurate diastolic assessment. Upper normal right ventricular size. Low normal right ventricular systolic function. Right ventricular systolic pressure 26 mmHg. Bioprosthetic aortic valve (#27 Abrams 2800, 9/15/15) which is not well visualized, mean gradient 6 mmHg. Trace prosthetic aortic Regurgitation. Ascending aorta not well visualized. #30 Hemashield gold graft 9/15/15. Dilated aorta at the sinus of valsalva (4.3 cm). Intraoperative AGATHA from 9/15/15 reviewed, the native aortic valve was bicuspid.     CT angio chest/abdome/pelvis 11/2/2015: 1. Changes of prior graft repair of ascending aortic dissection. Graft is patent without evidence for leak.  2. Unchanged residual aortic dissection starting in the distal arch and extending into the descending thoracic aorta. Dissection also extends into  the left common carotid artery, left vertebral artery and left subclavian Artery. 3. Multiple hepatic cysts visualized.      CT 11.2016: IMPRESSION:  1. Postsurgical changes of ascending aortic dissection graft repair. The graft remains patent without evidence of leak. See separate vascular dictation for further details.  2. Stable appearance of a dissection flap within the aortic arch and extending minimally into the left common carotid, left vertebral, and left subclavian arteries.  3. Unchanged appearance of multiple hepatic cysts.  1. Intact surgical repair of an ascending aortic dissection.  2. Intact hemiarch repair with a residual dissection involving the origins  of the left common carotid and left subclavian arteries  3. Normal caliber descending thoracic aorta and abdominal aorta without  evidence of dissection.  4. In comparison to a previous study of November 2, 2015, there is no  change in the surgical repairs. There has been thrombosis of the majority  of the false lumen previously seen in the left common  carotid. There may be  slight increase in the size of the proximal aortic arch from 3.5 cm to 3.7  cm.     Echo 9/2016 Normal left ventricular size, systolic function and wall thickness, with no regional wall motion abnormalities.Normal diastolic filling  Pattern. Normal right ventricular size and systolic function, RVSP 26.5 mmHg.  Normal bioprosthetic aortic valve. Aortic valve mean gradient is 3.9 mmHg. Trace periprosthetic regurgitation.  Normal bioprosthetic aortic valve. Aortic valve mean gradient is 3.9 mmHg. Trace periprosthetic regurgitation.  Normal ascending aorta dimension s/p aorta repair    Echo 8.2017: Normal left ventricular size, systolic function and wall thickness, with no regional wall motion abnormalities.Normal right ventricular size and systolic function.Catheter/pacemaker wire visualized in the right ventricle.  Bileaflet mitral valve prolapse.Mild mitral valve regurgitation.Normal bioprosthetic aortic valve.Mild aortic valve regurgitation.Mild tricuspid valve regurgitation.Trace pulmonary valve regurgitation.    1/17/17 CT HEAD  1. Mild sinusitis. Otherwise unremarkable exam.     CT angiogram chest 11/20171. Intact surgical repair of an ascending aortic dissection.  2. Intact hemiarch repair with a residual dissection involving the origins of the left common carotid and subclavian arteries that are stable.  3. Normal caliber descending thoracic aorta and abdominal aorta without evidence of dissection.  4. In comparison to a previous study of November 4, 2016, there is no change in the surgical repairs.     EKG 8/8 2018: Normal sinus rhythm, paced rhythm not appreciated.     6.2019 Mildly increased wall thickness. Normal left ventricular size, systolic function, with no regional wall motion abnormalities.Normal  diastolic function.  Normal right ventricular size and systolic function, RVSP 26.4 mmHg. Catheter/pacemaker wire visualized in the right ventricle.  Mild Bileaflet mitral valve  prolapse. Trace-to-mild mitral valve regurgitation.  Normal bioprosthetic aortic valve.aortic valve mean gradient is 8.3 mmHg. Mild aortic valve regurgitation.  Mild tricuspid valve regurgitation.  Trace pulmonary valve regurgitation.    10/2021 Echo   Prosthetic graft in the ascending aorta, 4.5 cm .  Bioprosthetic aortic valve. Aortic valve mean gradient is 9.3 mmHg.  Normal left ventricular size, systolic function and wall thickness, with no regional wall motion abnormalities. LVEF, 60 %.  No significant change since the prior study.    5/4/2021 CT Angio   Intact postsurgical appearance of the ascending aorta.  Stable intact appearance of the fenestrated aortic arch dissection flap  minimally extending into the left subclavian artery.  No central pulmonary embolism. Distal pulmonary arteries not evaluated.  Severe fatty liver. If the LFTs are elevated, the patient could have  symptomatic acute steatohepatitis.    7/15/2021 MRI Brain   1.  A few small lesions in the supratentorial white matter are nonspecific.  Although, this may represent mild chronic small vessel ischemic disease,  given the findings in the spine, these would be compatible with  demyelinating lesions. No enhancing lesions to suggest active  demyelination.   2.  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 No acute infarct.    10.2021 Prosthetic graft in the ascending aorta, 4.5 cm .  Bioprosthetic aortic valve. Aortic valve mean gradient is 9.3 mmHg.  Normal left ventricular size, systolic function and wall thickness, with no regional wall motion abnormalities. LVEF, 60 %.  No significant change since the prior study.      1. Paroxysmal atrial fibrillation (CMS/HCC)    2. Essential hypertension    3. S/P AVR (aortic valve replacement)    4. NSVT (nonsustained ventricular tachycardia) (CMS/HCC)        CARDIOVASCULAR-RELATED DIAGNOSES / Medical Decision Making On This Date      CT Angio 5/2021 showed= Heart/Pericardium:  Patent origins of the  coronary arteries. ICD leads  noted.    S/P aortic dissection repair, type A aortic dissection and  S/P AVR (aortic valve replacement) 2013 at 43 years old.    Stable 5/2021    Bileaflet mitral valve prolapse.  Mild mitral valve regurgitation.      CHB - s/p Pacer insertion 9/2015 Needs EP referral    Multiple Sclerosis? BLE weakness started 2019    New-onset paroxysmal atrial fibrillation; CHADSVASC 0   No recurrence since AVR          HTN There were no vitals filed for this visit.    120s/70s per patient home report      Anxiety     ETOH Abuse            3/17/2022   Summary:   Class 1, no angina or equivalents, CT cheat angio 5/2021=  Patent origins of the coronary arteries. ICD leads noted. He stated that he was in a wheelchair last year due to leg weakness, he attributes to aspirin. However, in chart review- Last neurology visit was on 10/15/2021 with Dr. Rodriguez - who had thought his mobility dysfunction was related to progressive MS. An office visit with Dr. Be with Infectious disease in 12/2021 was unremarkable, and MD approved the starting of Ocrevus.  Interestingly, since stopping aspirin in Fall 2021 his weakness has gotten better. HAs been working with PT in the home since mobility has been trying. (His father had to place hand controls in his vehicle for driving).  He has yet to follow up with Neurology, this would be recommended.     When asked about palpitations, patient is having less anxiety since speaking with a counselor and becoming more mobile with therapy, which has helped with not having any palpitations. (Hx of afib, none since valve placement) He had tried seeing a new provider in electrophysiology, but the visits kept on getting rescheduled. He's in need of an EP consult=Referral placed.   We'll not discontinue Xarelto-  wait till he sees EP/Earl and then decide on Amb tele to evaluate for AF)   Salomón Cardozo MD FACC  I am grateful to Ranjit Diane MD - communication today-for the  privilege of seeing this patient  Alyssia Jacobo RN as scribe for joint parts of this visit    Video Visit Consent: I connected the video via Zoom myself to this patient.  The patient with the practice is an established patient of [facility].The patient  is in [place] and their identity has been established.   The patient  understands that we are limiting office visits due to the coronavirus pandemic and  consents to a virtual visit with charges submitted to  insurance.   >30 total minutes were spent in this encounter, including MD chart review prior to the visit and documentation thereafter. Without the patient being seen and evaluated in person, there is a risk that the information and/or assessment may be incomplete or inaccurate.

## 2023-06-01 NOTE — DISCHARGE INSTRUCTIONS
Follow up with one of the [facility] Primary Care Providers below to setup primary care. If you need assistance coordinating a primary care appointment with a [facility] Primary Care Provider, please contact the Primary Care Coordinators at [phone] for appointment scheduling.    [facility], Primary Care   [address] , [address]   [place], [place] [address] [phone]    [facility] Internal Medicine & Endocrinology  [address], [address]  [place] [address] [phone]    [facility] Family Medicine  [address], [address]   [place], [place] [address] [phone]    [facility] Primary Care  [address], [address]  [place], [place] [address] [phone]    [facility], Primary Care,   [address], [address]   [place], [place] [address] [phone]    [facility] Primary Care  [address], [address]  [place], [place] [address] [phone]    [facility], Primary Care  [address], [address]  [place], [place] [address] [phone]    [facility] Internal Medicine & Pediatrics  [address], [address]   [place] [address] [phone]    [facility], Primary Care  [address]   [place], [place] [address] [phone]      [facility] Primary Care  [address], [address]   [place] [address] [phone]    [facility] Family Medicine  [address] [place] [address] Dr. Rodriguez, [place] [address] [phone]  Residential Treatment Facilities    Treatment Facility City Phone Number Gender   [facility] [place] [phone] Male/Female   [facility] [place] [phone] Female   [facility] [place] [phone] Male/Female   [facility] [place] [phone] Male   [facility] [place]  [phone] Male/Female   [facility] [phone] Male/Female   [facility] [place] [phone] Male/Female   [facility] [place] [phone] Female   [facility] [phone] Male   [facility] [phone] Female   [facility] [place] [phone] Male   [facility] [place] [phone] Male   [facility] (detox/Medicaid) [place] [phone] Male/Female   [facility] [place] [phone] Female   [facility] [place] [phone] Male/Female   [facility] [place] [phone] Pregnant Females   [facility] [place] [phone] Females on Probation   [facility] [phone] Male   [facility] [phone] Female     *Indicates 6 months or longer    Sliding Scale Detox    [facility] [place] [phone] Male/Female     Medical Detox    [facility]  Medicaid [place] [phone] Male/Female   [facility]  All insurances - 3 day detox [place] [phone] Male/Female     Adolescent Residential Programs    [facility] [phone] Male   [facility] [phone] Male/Female     Outpatient Sliding Fee Services - [place]    [facility] [place] [phone] Male/Female   [facility] [phone] Male/Female     Other Services    [facility]   [phone]  [facility]   [phone]  Arabic language available  [facility]    [phone]  [facility]  [phone]     aerobic capacity/endurance/gait, locomotion, and balance/muscle strength/ROM